# Patient Record
Sex: FEMALE | Race: WHITE | Employment: UNEMPLOYED | URBAN - METROPOLITAN AREA
[De-identification: names, ages, dates, MRNs, and addresses within clinical notes are randomized per-mention and may not be internally consistent; named-entity substitution may affect disease eponyms.]

---

## 2017-06-22 ENCOUNTER — HOSPITAL ENCOUNTER (EMERGENCY)
Age: 37
Discharge: STILL ADMITTED OR ADMINISTRATIVE | End: 2017-06-22
Attending: EMERGENCY MEDICINE

## 2017-06-22 ENCOUNTER — HOSPITAL ENCOUNTER (EMERGENCY)
Age: 37
Discharge: HOME | End: 2017-06-23
Attending: EMERGENCY MEDICINE
Payer: MEDICAID

## 2017-06-22 ENCOUNTER — APPOINTMENT (OUTPATIENT)
Dept: GENERAL RADIOLOGY | Age: 37
End: 2017-06-22
Attending: EMERGENCY MEDICINE
Payer: MEDICAID

## 2017-06-22 VITALS
BODY MASS INDEX: 42.23 KG/M2 | HEIGHT: 68 IN | OXYGEN SATURATION: 100 % | RESPIRATION RATE: 22 BRPM | WEIGHT: 278.66 LBS | TEMPERATURE: 98.2 F | HEART RATE: 76 BPM

## 2017-06-22 DIAGNOSIS — M79.602 ARM PAIN, DIFFUSE, LEFT: ICD-10-CM

## 2017-06-22 DIAGNOSIS — M79.18 PAIN OF RHOMBOID MUSCLE: Primary | ICD-10-CM

## 2017-06-22 DIAGNOSIS — M54.2 NECK PAIN: ICD-10-CM

## 2017-06-22 LAB
ALBUMIN SERPL BCP-MCNC: 3.8 G/DL (ref 3.5–5)
ALBUMIN/GLOB SERPL: 1.1 {RATIO} (ref 1.1–2.2)
ALP SERPL-CCNC: 52 U/L (ref 45–117)
ALT SERPL-CCNC: 34 U/L (ref 12–78)
ANION GAP BLD CALC-SCNC: 7 MMOL/L (ref 5–15)
AST SERPL W P-5'-P-CCNC: 19 U/L (ref 15–37)
BASOPHILS # BLD AUTO: 0 K/UL (ref 0–0.1)
BASOPHILS # BLD: 1 % (ref 0–1)
BILIRUB SERPL-MCNC: 0.9 MG/DL (ref 0.2–1)
BUN SERPL-MCNC: 11 MG/DL (ref 6–20)
BUN/CREAT SERPL: 13 (ref 12–20)
CALCIUM SERPL-MCNC: 9 MG/DL (ref 8.5–10.1)
CHLORIDE SERPL-SCNC: 106 MMOL/L (ref 97–108)
CK SERPL-CCNC: 74 U/L (ref 26–192)
CO2 SERPL-SCNC: 25 MMOL/L (ref 21–32)
CREAT SERPL-MCNC: 0.83 MG/DL (ref 0.55–1.02)
EOSINOPHIL # BLD: 0.1 K/UL (ref 0–0.4)
EOSINOPHIL NFR BLD: 2 % (ref 0–7)
ERYTHROCYTE [DISTWIDTH] IN BLOOD BY AUTOMATED COUNT: 15.5 % (ref 11.5–14.5)
GLOBULIN SER CALC-MCNC: 3.6 G/DL (ref 2–4)
GLUCOSE SERPL-MCNC: 85 MG/DL (ref 65–100)
HCT VFR BLD AUTO: 32.5 % (ref 35–47)
HGB BLD-MCNC: 10.3 G/DL (ref 11.5–16)
LYMPHOCYTES # BLD AUTO: 36 % (ref 12–49)
LYMPHOCYTES # BLD: 2 K/UL (ref 0.8–3.5)
MCH RBC QN AUTO: 23.9 PG (ref 26–34)
MCHC RBC AUTO-ENTMCNC: 31.7 G/DL (ref 30–36.5)
MCV RBC AUTO: 75.4 FL (ref 80–99)
MONOCYTES # BLD: 0.4 K/UL (ref 0–1)
MONOCYTES NFR BLD AUTO: 7 % (ref 5–13)
NEUTS SEG # BLD: 3.1 K/UL (ref 1.8–8)
NEUTS SEG NFR BLD AUTO: 54 % (ref 32–75)
PLATELET # BLD AUTO: 187 K/UL (ref 150–400)
POTASSIUM SERPL-SCNC: 3.6 MMOL/L (ref 3.5–5.1)
PROT SERPL-MCNC: 7.4 G/DL (ref 6.4–8.2)
RBC # BLD AUTO: 4.31 M/UL (ref 3.8–5.2)
SODIUM SERPL-SCNC: 138 MMOL/L (ref 136–145)
TROPONIN I SERPL-MCNC: <0.04 NG/ML
WBC # BLD AUTO: 5.6 K/UL (ref 3.6–11)

## 2017-06-22 PROCEDURE — 82550 ASSAY OF CK (CPK): CPT | Performed by: EMERGENCY MEDICINE

## 2017-06-22 PROCEDURE — 99282 EMERGENCY DEPT VISIT SF MDM: CPT

## 2017-06-22 PROCEDURE — 80053 COMPREHEN METABOLIC PANEL: CPT | Performed by: EMERGENCY MEDICINE

## 2017-06-22 PROCEDURE — 84484 ASSAY OF TROPONIN QUANT: CPT | Performed by: EMERGENCY MEDICINE

## 2017-06-22 PROCEDURE — 75810000275 HC EMERGENCY DEPT VISIT NO LEVEL OF CARE

## 2017-06-22 PROCEDURE — 93005 ELECTROCARDIOGRAM TRACING: CPT

## 2017-06-22 PROCEDURE — 96372 THER/PROPH/DIAG INJ SC/IM: CPT

## 2017-06-22 PROCEDURE — 85025 COMPLETE CBC W/AUTO DIFF WBC: CPT | Performed by: EMERGENCY MEDICINE

## 2017-06-22 PROCEDURE — 72050 X-RAY EXAM NECK SPINE 4/5VWS: CPT

## 2017-06-22 PROCEDURE — 36415 COLL VENOUS BLD VENIPUNCTURE: CPT | Performed by: EMERGENCY MEDICINE

## 2017-06-22 RX ORDER — KETOROLAC TROMETHAMINE 30 MG/ML
30 INJECTION, SOLUTION INTRAMUSCULAR; INTRAVENOUS
Status: COMPLETED | OUTPATIENT
Start: 2017-06-22 | End: 2017-06-23

## 2017-06-22 RX ORDER — KETOROLAC TROMETHAMINE 30 MG/ML
30 INJECTION, SOLUTION INTRAMUSCULAR; INTRAVENOUS
Status: DISCONTINUED | OUTPATIENT
Start: 2017-06-22 | End: 2017-06-22

## 2017-06-23 LAB
ATRIAL RATE: 78 BPM
CALCULATED P AXIS, ECG09: 30 DEGREES
CALCULATED R AXIS, ECG10: 7 DEGREES
CALCULATED T AXIS, ECG11: 19 DEGREES
DIAGNOSIS, 93000: NORMAL
P-R INTERVAL, ECG05: 158 MS
Q-T INTERVAL, ECG07: 386 MS
QRS DURATION, ECG06: 82 MS
QTC CALCULATION (BEZET), ECG08: 440 MS
VENTRICULAR RATE, ECG03: 78 BPM

## 2017-06-23 PROCEDURE — 74011250636 HC RX REV CODE- 250/636: Performed by: EMERGENCY MEDICINE

## 2017-06-23 RX ORDER — TRAMADOL HYDROCHLORIDE 50 MG/1
50 TABLET ORAL
Qty: 12 TAB | Refills: 0 | Status: SHIPPED | OUTPATIENT
Start: 2017-06-23 | End: 2017-07-03

## 2017-06-23 RX ADMIN — KETOROLAC TROMETHAMINE 30 MG: 30 INJECTION, SOLUTION INTRAMUSCULAR at 00:17

## 2017-06-23 NOTE — ED NOTES
MD Tyson Jhaveri has done a bedside review of the discharge instructions. The patient is in understanding. The patients line(s) are removed. The patient is dressed, and belongings together for discharge.

## 2017-06-23 NOTE — DISCHARGE INSTRUCTIONS
Musculoskeletal Pain: Care Instructions  Your Care Instructions  Different problems with the bones, muscles, nerves, ligaments, and tendons in the body can cause pain. One or more areas of your body may ache or burn. Or they may feel tired, stiff, or sore. The medical term for this type of pain is musculoskeletal pain. It can have many different causes. Sometimes the pain is caused by an injury such as a strain or sprain. Or you might have pain from using one part of your body in the same way over and over again. This is called overuse. In some cases, the cause of the pain is another health problem such as arthritis or fibromyalgia. The doctor will examine you and ask you questions about your health to help find the cause of your pain. Blood tests or imaging tests like an X-ray may also be helpful. But sometimes doctors can't find a cause of the pain. Treatment depends on your symptoms and the cause of the pain, if known. The doctor has checked you carefully, but problems can develop later. If you notice any problems or new symptoms, get medical treatment right away. Follow-up care is a key part of your treatment and safety. Be sure to make and go to all appointments, and call your doctor if you are having problems. It's also a good idea to know your test results and keep a list of the medicines you take. How can you care for yourself at home? · Rest until you feel better. · Do not do anything that makes the pain worse. Return to exercise gradually if you feel better and your doctor says it's okay. · Be safe with medicines. Read and follow all instructions on the label. ¨ If the doctor gave you a prescription medicine for pain, take it as prescribed. ¨ If you are not taking a prescription pain medicine, ask your doctor if you can take an over-the-counter medicine. · Put ice or a cold pack on the area for 10 to 20 minutes at a time to ease pain. Put a thin cloth between the ice and your skin.   When should you call for help? Call your doctor now or seek immediate medical care if:  · You have new pain, or your pain gets worse. · You have new symptoms such as a fever, a rash, or chills. Watch closely for changes in your health, and be sure to contact your doctor if:  · You do not get better as expected. Where can you learn more? Go to [employer] Search.be  Enter Q624 in the search box to learn more about \"Musculoskeletal Pain: Care Instructions. \"   © 0519-5910 Healthwise, Incorporated. Care instructions adapted under license by Mirtha Marmolejo (which disclaims liability or warranty for this information). This care instruction is for use with your licensed healthcare professional. If you have questions about a medical condition or this instruction, always ask your healthcare professional. [address] any warranty or liability for your use of this information.   Content Version: 32.7.979763; Current as of: November 20, 2015

## 2017-06-23 NOTE — ED PROVIDER NOTES
HPI Comments: Signa Collet is a 30 y.o. female with history significant for HTN presenting ambulatory to [facility] ED with c/o gradually worsening pain from her left neck region to left upper extremity. Per pt, she has been experiencing an aching sensation from the left lateral neck radiating down towards the left upper extremity. Pt informs the sensation provides with a moderate 7/10 intensity. Pt notes the regions feel like they are \"overused\" and \"wore out\". Pt additionally informs of intermittent nausea and a decreased appetite for the past three days. Pt states her LMP was 06/14/2017. Of note, pt reports she is not from the [facility] and is from [place]. She informs she is town as she was visiting [address] [place] [address] with her kids. Pt denies any history of MVA's, and denies any recent injury or trauma to the aforementioned areas. Pt additionally specifically denies any vomiting, fevers, chills, numbness, chest pain, or SOB. PCP: TIMMY Clark    Social Hx: - EtOH; - Smoker; - Illicit Drugs    There are no other changes, complaints or physical findings at this time. Written by THEODORA Coy, as dictated by Kisha Moss MD.     The history is provided by the patient. History reviewed. No pertinent past medical history. History reviewed. No pertinent surgical history. History reviewed. No pertinent family history. Social History     Social History    Marital status:      Spouse name: N/A    Number of children: N/A    Years of education: N/A     Occupational History    Not on file. Social History Main Topics    Smoking status: Never Smoker    Smokeless tobacco: Not on file    Alcohol use No    Drug use: No    Sexual activity: Not on file     Other Topics Concern    Not on file     Social History Narrative    No narrative on file     ALLERGIES: Iron    Review of Systems   Constitutional: Positive for appetite change (dec).  Negative for activity change, fatigue and fever.   HENT: Negative. Negative for congestion, rhinorrhea and sore throat. Respiratory: Negative. Negative for cough, shortness of breath and wheezing. Cardiovascular: Negative. Negative for chest pain and leg swelling. Gastrointestinal: Positive for nausea. Negative for abdominal distention, abdominal pain, constipation, diarrhea and vomiting. Endocrine: Negative. Genitourinary: Negative for difficulty urinating, dysuria, menstrual problem, vaginal bleeding and vaginal discharge. Musculoskeletal: Positive for arthralgias (LUE), myalgias (LUE) and neck pain. Negative for joint swelling. Skin: Negative. Negative for rash. Neurological: Negative. Negative for dizziness, weakness, light-headedness, numbness and headaches. Psychiatric/Behavioral: Negative. Vitals:    06/22/17 2142   Pulse: 76   Resp: 22   Temp: 98.2 °F (36.8 °C)   SpO2: 100%   Weight: 126.4 kg (278 lb 10.6 oz)   Height: 5' 8\" (1.727 m)          Physical Exam   Constitutional: She is oriented to person, place, and time. She appears well-developed and well-nourished. HENT:   Head: Atraumatic. Eyes: EOM are normal.   Neck:   Full ROM with mild discomfort with left rotary movement and flexion   Cardiovascular: Normal rate, regular rhythm, normal heart sounds and intact distal pulses. Exam reveals no gallop and no friction rub. No murmur heard. Pulmonary/Chest: Effort normal and breath sounds normal. No respiratory distress. She has no wheezes. She has no rales. She exhibits no tenderness. Abdominal: Soft. Bowel sounds are normal. She exhibits no distension and no mass. There is no tenderness. There is no rebound and no guarding. Musculoskeletal: Normal range of motion. She exhibits tenderness. She exhibits no edema. ttp over left, paraspinal muscles  No c-spine ttp  Tenderness over L rhomboid and trapezius which reproduces the pain   Neurological: She is oriented to person, place, and time.  She has normal strength. No sensory deficit. EOM intact, pupils direct and consensual, reflexes intact, CN II-XII grossly intact, strength equal and symmetric in LUE, sensation intact in LUE, alert and oriented   Skin: Skin is warm. Psychiatric: She has a normal mood and affect. Nursing note and vitals reviewed. MDM  Number of Diagnoses or Management Options  Diagnosis management comments: DDx: MSK strain, muscular spasms, DDD       Amount and/or Complexity of Data Reviewed  Clinical lab tests: ordered and reviewed  Tests in the radiology section of CPT®: ordered and reviewed  Tests in the medicine section of CPT®: reviewed and ordered    Patient Progress  Patient progress: stable    ED Course     Procedures    EKG interpretation: (Preliminary) 2146  Rhythm: normal sinus rhythm; and regular . Rate (approx.): 78; Axis: normal; AL interval: normal; QRS interval: normal ; ST/T wave: normal; other: minimal voltage criteria for LVH;   This note is prepared by Obi Butler acting as Scribe for Kar Hess MD.    LABORATORY TESTS:  Recent Results (from the past 12 hour(s))   EKG, 12 LEAD, INITIAL    Collection Time: 06/22/17  9:46 PM   Result Value Ref Range    Ventricular Rate 78 BPM    Atrial Rate 78 BPM    P-R Interval 158 ms    QRS Duration 82 ms    Q-T Interval 386 ms    QTC Calculation (Bezet) 440 ms    Calculated P Axis 30 degrees    Calculated R Axis 7 degrees    Calculated T Axis 19 degrees    Diagnosis       Normal sinus rhythm  Minimal voltage criteria for LVH, may be normal variant  Possible Anterior infarct , age undetermined  Abnormal ECG  No previous ECGs available     CBC WITH AUTOMATED DIFF    Collection Time: 06/22/17  9:53 PM   Result Value Ref Range    WBC 5.6 3.6 - 11.0 K/uL    RBC 4.31 3.80 - 5.20 M/uL    HGB 10.3 (L) 11.5 - 16.0 g/dL    HCT 32.5 (L) 35.0 - 47.0 %    MCV 75.4 (L) 80.0 - 99.0 FL    MCH 23.9 (L) 26.0 - 34.0 PG    MCHC 31.7 30.0 - 36.5 g/dL    RDW 15.5 (H) 11.5 - 14.5 %    PLATELET 496 656 - 400 K/uL    NEUTROPHILS 54 32 - 75 %    LYMPHOCYTES 36 12 - 49 %    MONOCYTES 7 5 - 13 %    EOSINOPHILS 2 0 - 7 %    BASOPHILS 1 0 - 1 %    ABS. NEUTROPHILS 3.1 1.8 - 8.0 K/UL    ABS. LYMPHOCYTES 2.0 0.8 - 3.5 K/UL    ABS. MONOCYTES 0.4 0.0 - 1.0 K/UL    ABS. EOSINOPHILS 0.1 0.0 - 0.4 K/UL    ABS. BASOPHILS 0.0 0.0 - 0.1 K/UL   METABOLIC PANEL, COMPREHENSIVE    Collection Time: 06/22/17  9:53 PM   Result Value Ref Range    Sodium 138 136 - 145 mmol/L    Potassium 3.6 3.5 - 5.1 mmol/L    Chloride 106 97 - 108 mmol/L    CO2 25 21 - 32 mmol/L    Anion gap 7 5 - 15 mmol/L    Glucose 85 65 - 100 mg/dL    BUN 11 6 - 20 MG/DL    Creatinine 0.83 0.55 - 1.02 MG/DL    BUN/Creatinine ratio 13 12 - 20      GFR est AA >60 >60 ml/min/1.73m2    GFR est non-AA >60 >60 ml/min/1.73m2    Calcium 9.0 8.5 - 10.1 MG/DL    Bilirubin, total 0.9 0.2 - 1.0 MG/DL    ALT (SGPT) 34 12 - 78 U/L    AST (SGOT) 19 15 - 37 U/L    Alk. phosphatase 52 45 - 117 U/L    Protein, total 7.4 6.4 - 8.2 g/dL    Albumin 3.8 3.5 - 5.0 g/dL    Globulin 3.6 2.0 - 4.0 g/dL    A-G Ratio 1.1 1.1 - 2.2     CK W/ REFLX CKMB    Collection Time: 06/22/17  9:53 PM   Result Value Ref Range    CK 74 26 - 192 U/L   TROPONIN I    Collection Time: 06/22/17  9:53 PM   Result Value Ref Range    Troponin-I, Qt. <0.04 <0.05 ng/mL     IMAGING RESULTS:  History: Neck pain.     AP, lateral, odontoid, swimmer's and oblique views of the cervical spine  demonstrate no fracture or subluxation. Disc spaces are preserved. No bony  foraminal encroachment is seen. There is normal alignment of the vertebral  bodies. The soft tissues are unremarkable.     IMPRESSION  IMPRESSION:  NORMAL STUDY. MEDICATIONS GIVEN:  Medications   ketorolac (TORADOL) injection 30 mg (30 mg IntraVENous Given 6/23/17 0017)     IMPRESSION:  1. Pain of rhomboid muscle    2. Arm pain, diffuse, left    3. Neck pain      PLAN:  1.    Follow-up Information     Follow up With Details Comments Contact Info [place], [place] In 3 days  [address]   [phone]      [facility] EMERGENCY DEPT  As needed, If symptoms worsen [address] [place] [address]  [phone]        Return to ED if worse     DISCHARGE NOTE:     1:20 AM  The patient is ready for discharge. The patient signs, symptoms, diagnosis, and discharge instructions have been discussed and the patient has conveyed their understanding. The patient is to follow-up as reccommended or returned to the ER should their symptoms worsen. Plan has been discussed and patient is in agreement. This note is prepared by Abhi Merchant acting as Scribe for Reba Aguilar MD.    Reba Aguilar MD: This scribe's documentation has been prepared under my direction and personally reviewed by me in its entirety. I confirm that the note above accurately reflects all work, treatment procedures and medical decision makings performed by me.

## 2017-06-23 NOTE — ED TRIAGE NOTES
Assumed care of patient in the Emergency Department, addressed patient and family with AIDET process. The patient reports to the ED with complaints of neck and shoulder pain, left side, for weeks. Headache for weeks, no vision disturbances. Denies n/v/d. Denies recent cough. Family at bedside. Denies cardiac history. Patient is resting comfortably, bed in the lowest position, side rails raised, call bell in hand, lights dim. Instructed patient to not get up without assistance, and to ring the call bell for any questions or concerns. Updated patient on the plan of care.

## 2018-02-13 ENCOUNTER — HOSPITAL ENCOUNTER (EMERGENCY)
Age: 38
Discharge: HOME | End: 2018-02-13
Attending: EMERGENCY MEDICINE
Payer: SELF-PAY

## 2018-02-13 ENCOUNTER — APPOINTMENT (OUTPATIENT)
Dept: GENERAL RADIOLOGY | Age: 38
End: 2018-02-13
Attending: EMERGENCY MEDICINE
Payer: SELF-PAY

## 2018-02-13 VITALS
RESPIRATION RATE: 15 BRPM | DIASTOLIC BLOOD PRESSURE: 97 MMHG | OXYGEN SATURATION: 99 % | BODY MASS INDEX: 36.37 KG/M2 | HEART RATE: 67 BPM | WEIGHT: 240 LBS | TEMPERATURE: 98.2 F | HEIGHT: 68 IN | SYSTOLIC BLOOD PRESSURE: 138 MMHG

## 2018-02-13 DIAGNOSIS — Z23 NEED FOR TETANUS BOOSTER: Primary | ICD-10-CM

## 2018-02-13 DIAGNOSIS — S63.601A SPRAIN OF RIGHT THUMB, UNSPECIFIED SITE OF FINGER, INITIAL ENCOUNTER: ICD-10-CM

## 2018-02-13 DIAGNOSIS — S60.311A ABRASION OF RIGHT THUMB, INITIAL ENCOUNTER: ICD-10-CM

## 2018-02-13 PROCEDURE — 77030008323 HC SPLNT FNGR GTR DJOR -A

## 2018-02-13 PROCEDURE — 99283 EMERGENCY DEPT VISIT LOW MDM: CPT

## 2018-02-13 PROCEDURE — 90471 IMMUNIZATION ADMIN: CPT

## 2018-02-13 PROCEDURE — 73140 X-RAY EXAM OF FINGER(S): CPT

## 2018-02-13 PROCEDURE — 90715 TDAP VACCINE 7 YRS/> IM: CPT | Performed by: EMERGENCY MEDICINE

## 2018-02-13 PROCEDURE — 74011250636 HC RX REV CODE- 250/636: Performed by: EMERGENCY MEDICINE

## 2018-02-13 RX ADMIN — TETANUS TOXOID, REDUCED DIPHTHERIA TOXOID AND ACELLULAR PERTUSSIS VACCINE, ADSORBED 0.5 ML: 5; 2.5; 8; 8; 2.5 SUSPENSION INTRAMUSCULAR at 11:51

## 2018-02-13 NOTE — LETTER
[facility] EMERGENCY DEPT 
[address] [place] [phone] 
[phone] Work Note Date: 2/13/2018 To Whom It May concern: 
 
Katina Keane was seen and treated today in the emergency room by the following provider(s): 
Attending Provider: Ronak Sanders DO.   
 
Katina Keane may return to work on 2/16/18. Sincerely, Ronak Sanders DO

## 2018-02-13 NOTE — ED PROVIDER NOTES
EMERGENCY DEPARTMENT HISTORY AND PHYSICAL EXAM    12:08 PM      Date: 2018  Patient Name: Ellen Christina    History of Presenting Illness     Chief Complaint   Patient presents with    Finger Pain         History Provided By: Patient    Chief Complaint: Finger Pain   Duration:  Hours  Timing:  Acute  Location: Right thumb   Quality: Throbbing   Severity: 6 out of 10  Modifying Factors: None reported   Associated Symptoms: right thumb laceration and swelling      Additional History (Context): Ellen Christina is a 41 y.o. female with PMHx of hypertension and asthma who presents with c/o acute 6/10 right thumb pain which started yesterday. Describes pain as throbbing. Pt notes her thumb got crushed at work last night. Associated Sx includes right thumb laceration and swelling. Pt is unsure of when her last tetanus shot was. No mediation taken to treat Sx. Notes compliance with HTN medication. No other complaint and Sx. PCP: TIMMY Goldsmith    Current Outpatient Prescriptions   Medication Sig Dispense Refill    verapamil (CALAN) 120 mg tablet Take 120 mg by mouth three (3) times daily. Past History     Past Medical History:  Past Medical History:   Diagnosis Date    Anemia     receives iron infusions    Asthma     Hypertension        Past Surgical History:  Past Surgical History:   Procedure Laterality Date    HX  SECTION      HX CHOLECYSTECTOMY         Family History:  Family History   Problem Relation Age of Onset    Cancer Maternal Grandmother     Stroke Mother     Obesity Father        Social History:  Social History   Substance Use Topics    Smoking status: Never Smoker    Smokeless tobacco: Never Used    Alcohol use No       Allergies:   Allergies   Allergen Reactions    Iron Other (comments)     Burning per pt reports    Iron Other (comments)     Body feels like on fire    Zofran Odt [Ondansetron] Hives    Zoloft [Sertraline] Other (comments)     Burning per pt reports         Review of Systems     Review of Systems   Constitutional: Negative for fever. Musculoskeletal:        Positive for right thumb pain   Positive for right thumb swelling    Skin: Wound: right thumb    All other systems reviewed and are negative. Physical Exam     Visit Vitals    BP (!) 138/97 (BP 1 Location: Right arm, BP Patient Position: At rest)    Pulse 67    Temp 98.2 °F (36.8 °C)    Resp 15    Ht 5' 8\" (1.727 m)    Wt 108.9 kg (240 lb)    LMP 02/13/2018    SpO2 99%    BMI 36.49 kg/m2     Physical Exam   Constitutional: She is oriented to person, place, and time. She appears well-developed and well-nourished. HENT:   Head: Normocephalic and atraumatic. Neck: Neck supple. No JVD present. Musculoskeletal: She exhibits no edema. R hand: radial pulse 2+  Cap refill 1 sec  Gross sensation intact  No MCP tenderness digits 2-5  Able to fully extend all digits  No snuffbox tenderness  No pain with axial loading or distraction of thumb  Abrasion over 1st digit PIP  Full ROM in wrist   Neurological: She is alert and oriented to person, place, and time. Skin: Skin is warm and dry. No erythema. Radiologic Studies -   XR THUMB RT MIN 2 V    (Results Pending)   no acute process     Impression:  No acute process. Medical Decision Making   I am the first provider for this patient. I reviewed the vital signs, available nursing notes, past medical history, past surgical history, family history and social history. Vital Signs-Reviewed the patient's vital signs. Pulse Oximetry Analysis -  99% on room air, normal     Records Reviewed: Nursing Notes (Time of Review: 12:08 PM)    ED Course: Progress Notes, Reevaluation, and Consults:    Provider Notes (Medical Decision Making): 40 y/o female presents with R thumb pain. xr to eval for fx. Update tetanus. No lac.  Doubt scaphoid injury based on exam.     Procedures: Splint, Finger  Date/Time: 2/13/2018 12:29 PM  Performed by: Diane Bowman  Authorized by: Diane Bowman     Consent:     Consent obtained:  Verbal    Consent given by:  Patient    Risks discussed:  Numbness and pain    Alternatives discussed:  No treatment  Pre-procedure details:     Sensation:  Normal    Skin color:  Pink  Procedure details:     Laterality:  Right    Location: thumb     Strapping: no      Splint type:  Finger  Post-procedure details:     Pain:  Unchanged    Sensation:  Normal    Skin color:  Pink    Patient tolerance of procedure: Tolerated well, no immediate complications        Discussed xr results with pt, placed in finger splint. Discussed return precautions, stable for dc home. Diagnosis     Clinical Impression:   1. Need for tetanus booster    2. Abrasion of right thumb, initial encounter    3. Sprain of right thumb, unspecified site of finger, initial encounter        Disposition: Discharge    Follow-up Information     Follow up With Details Comments [address], [place] In 1 week  [facility] [address] [place] [address] EMERGENCY DEPT  As needed, If symptoms worsen [facility].  [phone]           Patient's Medications   Start Taking    No medications on file   Continue Taking    VERAPAMIL (CALAN) 120 MG TABLET    Take 120 mg by mouth three (3) times daily. These Medications have changed    No medications on file   Stop Taking    MOMETASONE-FORMOTEROL (DULERA) 100-5 MCG/ACTUATION HFAA HFA INHALER    Take 2 Puffs by inhalation two (2) times a day.     ONDANSETRON HCL (ZOFRAN, AS HYDROCHLORIDE,) 8 MG TABLET    Take 1 Tab by mouth every eight (8) hours as needed for Nausea.     _______________________________    Attestations:  Scribe Attestation     Bailee Putnam acting as a scribe for and in the presence of Mahnaz Mooney, DO      February 13, 2018 at 12:08 PM       Provider Attestation:      I personally performed the services described in the documentation, reviewed the documentation, as recorded by the scribe in my presence, and it accurately and completely records my words and actions.  February 13, 2018 at 12:08 PM - Hu Martinez DO    _______________________________

## 2018-02-13 NOTE — LETTER
[facility] EMERGENCY DEPT 
[facility] [place] [phone] 
[phone] Work Note Date: 2/13/2018 To Whom It May concern: 
 
Robb Mejias was seen and treated today in the emergency room by the following provider(s): 
Attending Provider: Lul Cortés DO.   
 
Robb Mejias may return to work on 2/16/18. Sincerely, Lul Cortés DO

## 2018-02-13 NOTE — DISCHARGE INSTRUCTIONS
Scrapes (Abrasions): Care Instructions  Your Care Instructions  Scrapes (abrasions) are wounds where your skin has been rubbed or torn off. Most scrapes do not go deep into the skin, but some may remove several layers of skin. Scrapes usually don't bleed much, but they may ooze pinkish fluid. Scrapes on the head or face may appear worse than they are. They may bleed a lot because of the good blood supply to this area. Most scrapes heal well and may not need a bandage. They usually heal within 3 to 7 days. A large, deep scrape may take 1 to 2 weeks or longer to heal. A scab may form on some scrapes. Follow-up care is a key part of your treatment and safety. Be sure to make and go to all appointments, and call your doctor if you are having problems. It's also a good idea to know your test results and keep a list of the medicines you take. How can you care for yourself at home? · If your doctor told you how to care for your wound, follow your doctor's instructions. If you did not get instructions, follow this general advice:  ¨ Wash the scrape with clean water 2 times a day. Don't use hydrogen peroxide or alcohol, which can slow healing. ¨ You may cover the scrape with a thin layer of petroleum jelly, such as Vaseline, and a nonstick bandage. ¨ Apply more petroleum jelly and replace the bandage as needed. · Prop up the injured area on a pillow anytime you sit or lie down during the next 3 days. Try to keep it above the level of your heart. This will help reduce swelling. · Be safe with medicines. Take pain medicines exactly as directed. ¨ If the doctor gave you a prescription medicine for pain, take it as prescribed. ¨ If you are not taking a prescription pain medicine, ask your doctor if you can take an over-the-counter medicine. When should you call for help?   Call your doctor now or seek immediate medical care if:  ? · You have signs of infection, such as:  ¨ Increased pain, swelling, warmth, or redness around the scrape. ¨ Red streaks leading from the scrape. ¨ Pus draining from the scrape. ¨ A fever. ? · The scrape starts to bleed, and blood soaks through the bandage. Oozing small amounts of blood is normal.   ? Watch closely for changes in your health, and be sure to contact your doctor if the scrape is not getting better each day. Where can you learn more? Go to http://bennie-chelsey.info/. Enter A374 in the search box to learn more about \"Scrapes (Abrasions): Care Instructions. \"  Current as of: March 20, 2017  Content Version: 11.4  © 5228-1752 [employer]. Care instructions adapted under license by [employer] (which disclaims liability or warranty for this information). If you have questions about a medical condition or this instruction, always ask your healthcare professional. [place] [address] any warranty or liability for your use of this information.

## 2018-10-24 ENCOUNTER — HOSPITAL ENCOUNTER (EMERGENCY)
Age: 38
Discharge: HOME | End: 2018-10-24
Attending: EMERGENCY MEDICINE | Admitting: EMERGENCY MEDICINE
Payer: SELF-PAY

## 2018-10-24 ENCOUNTER — APPOINTMENT (OUTPATIENT)
Dept: GENERAL RADIOLOGY | Age: 38
End: 2018-10-24
Attending: EMERGENCY MEDICINE
Payer: SELF-PAY

## 2018-10-24 VITALS
HEIGHT: 69 IN | BODY MASS INDEX: 38.51 KG/M2 | TEMPERATURE: 98.5 F | SYSTOLIC BLOOD PRESSURE: 137 MMHG | RESPIRATION RATE: 16 BRPM | OXYGEN SATURATION: 100 % | DIASTOLIC BLOOD PRESSURE: 99 MMHG | WEIGHT: 260 LBS | HEART RATE: 79 BPM

## 2018-10-24 DIAGNOSIS — S99.911A RIGHT ANKLE INJURY, INITIAL ENCOUNTER: Primary | ICD-10-CM

## 2018-10-24 PROCEDURE — 74011250637 HC RX REV CODE- 250/637: Performed by: EMERGENCY MEDICINE

## 2018-10-24 PROCEDURE — 99284 EMERGENCY DEPT VISIT MOD MDM: CPT

## 2018-10-24 PROCEDURE — 73610 X-RAY EXAM OF ANKLE: CPT

## 2018-10-24 RX ORDER — NAPROXEN 500 MG/1
500 TABLET ORAL 2 TIMES DAILY WITH MEALS
Qty: 20 TAB | Refills: 0 | Status: SHIPPED | OUTPATIENT
Start: 2018-10-24 | End: 2018-11-03

## 2018-10-24 RX ORDER — ACETAMINOPHEN 500 MG
1000 TABLET ORAL
Status: COMPLETED | OUTPATIENT
Start: 2018-10-24 | End: 2018-10-24

## 2018-10-24 RX ORDER — HYDROCODONE BITARTRATE AND ACETAMINOPHEN 7.5; 325 MG/1; MG/1
1 TABLET ORAL
Status: DISCONTINUED | OUTPATIENT
Start: 2018-10-24 | End: 2018-10-24

## 2018-10-24 RX ORDER — IBUPROFEN 600 MG/1
600 TABLET ORAL
Status: COMPLETED | OUTPATIENT
Start: 2018-10-24 | End: 2018-10-24

## 2018-10-24 RX ORDER — ACETAMINOPHEN 500 MG
1000 TABLET ORAL
Qty: 50 TAB | Refills: 0 | Status: SHIPPED | OUTPATIENT
Start: 2018-10-24 | End: 2019-10-11

## 2018-10-24 RX ADMIN — ACETAMINOPHEN 1000 MG: 500 TABLET, FILM COATED ORAL at 22:22

## 2018-10-24 RX ADMIN — IBUPROFEN 600 MG: 600 TABLET ORAL at 21:57

## 2018-10-24 NOTE — LETTER
NOTIFICATION RETURN TO WORK / SCHOOL 
 
10/24/2018 10:45 PM 
 
Ms. Derek Damon [address] [facility] [phone] To Whom It May Concern: 
 
Derek Damon is currently under the care of [facility] EMERGENCY DEPT. She will return to work/school on: 10/27/18 If there are questions or concerns please have the patient contact our office. Sincerely, Larry Pollard MD

## 2018-10-25 NOTE — ED PROVIDER NOTES
Aidee Aponte is a 41 y.o. Female who missed step, injuring her right ankle about an hour ago. No other injury. C/o severe pain to right ankle with swelling to lateral aspect. Nothing taken. Worse with movement, attempted ambulation. No recent illness. Past Medical History:   Diagnosis Date    Anemia     receives iron infusions    Asthma     Hypertension        Past Surgical History:   Procedure Laterality Date    HX  SECTION      HX CHOLECYSTECTOMY           Family History:   Problem Relation Age of Onset    Cancer Maternal Grandmother     Stroke Mother     Obesity Father        Social History     Socioeconomic History    Marital status:      Spouse name: Not on file    Number of children: Not on file    Years of education: Not on file    Highest education level: Not on file   Social Needs    Financial resource strain: Not on file    Food insecurity - worry: Not on file    Food insecurity - inability: Not on file   [employer] needs - medical: Not on file   [employer] needs - non-medical: Not on file   Occupational History    Not on file   Tobacco Use    Smoking status: Never Smoker    Smokeless tobacco: Never Used   Substance and Sexual Activity    Alcohol use: No    Drug use: No    Sexual activity: Not on file   Other Topics Concern    Not on file   Social History Narrative    ** Merged History Encounter **              ALLERGIES: Iron; Iron; Zofran odt [ondansetron]; and Zoloft [sertraline]    Review of Systems   Constitutional: Negative for fever. Respiratory: Negative for shortness of breath. Cardiovascular: Negative for chest pain. Gastrointestinal: Negative for abdominal pain. Genitourinary: Negative for difficulty urinating. Musculoskeletal: Positive for arthralgias, gait problem and joint swelling. Skin: Negative for wound. Neurological: Negative for numbness. Psychiatric/Behavioral: Positive for sleep disturbance.        Vitals: 10/24/18 2136   BP: (!) 137/99   Pulse: 79   Resp: 16   Temp: 98.5 °F (36.9 °C)   SpO2: 100%   Weight: 117.9 kg (260 lb)   Height: 5' 9\" (1.753 m)            Physical Exam   Constitutional: She is oriented to person, place, and time. She appears well-developed and well-nourished. Non-toxic appearance. She does not have a sickly appearance. She does not appear ill. She appears distressed. HENT:   Head: Normocephalic and atraumatic. Eyes: EOM are normal.   Neck: Neck supple. Cardiovascular: Normal rate, regular rhythm and normal heart sounds. Pulmonary/Chest: Breath sounds normal.   Abdominal: Soft. Musculoskeletal:        Right ankle: She exhibits decreased range of motion and swelling. She exhibits no ecchymosis, no deformity, no laceration and normal pulse. Tenderness. Lateral malleolus, medial malleolus, AITFL, CF ligament and posterior TFL tenderness found. No head of 5th metatarsal and no proximal fibula tenderness found. Achilles tendon normal.   Neurological: She is alert and oriented to person, place, and time. No cranial nerve deficit. Skin: Skin is warm and dry. Capillary refill takes less than 2 seconds. She is not diaphoretic. Psychiatric: She has a normal mood and affect. Nursing note and vitals reviewed. MDM       Procedures  Vitals:  Patient Vitals for the past 12 hrs:   Temp Pulse Resp BP SpO2   10/24/18 2136 98.5 °F (36.9 °C) 79 16 (!) 137/99 100 %         Medications ordered:   Medications   ibuprofen (MOTRIN) tablet 600 mg (600 mg Oral Given 10/24/18 2157)   acetaminophen (TYLENOL) tablet 1,000 mg (1,000 mg Oral Given 10/24/18 2222)         Lab findings:  No results found for this or any previous visit (from the past 12 hour(s)).     EKG interpretation by ED Physician:      X-Ray, CT or other radiology findings or impressions:  XR ANKLE RT MIN 3 V    (Results Pending)   nothing acute per my interp    Progress notes, Consult notes or additional Procedure notes:   Placed in air splint, crutches by tech with md sup, nv intact  I have discussed with patient and/or family/sig other the results, interpretation of any imaging if performed, suspected diagnosis and treatment plan to include instructions regarding the diagnoses listed to which understanding was expressed with all questions answered      Reevaluation of patient:   stable    Disposition:  Diagnosis:   1. Right ankle injury, initial encounter        Disposition: home    Follow-up Information     Follow up With Specialties Details Why Contact Info    Candi Grijalva DPM Podiatry Schedule an appointment as soon as possible for a visit    [address] EMERGENCY DEPT Emergency Medicine   [address] [facility]  [phone]               Medication List      START taking these medications    acetaminophen 500 mg tablet  Commonly known as:  TYLENOL EXTRA STRENGTH  Take 2 Tabs by mouth every six (6) hours as needed for Pain. naproxen 500 mg tablet  Commonly known as:  NAPROSYN  Take 1 Tab by mouth two (2) times daily (with meals) for 10 days.         ASK your doctor about these medications    verapamil 120 mg tablet  Commonly known as:  CALAN           Where to Get Your Medications      Information about where to get these medications is not yet available    Ask your nurse or doctor about these medications  · acetaminophen 500 mg tablet  · naproxen 500 mg tablet

## 2019-05-28 ENCOUNTER — HOSPITAL ENCOUNTER (EMERGENCY)
Age: 39
Discharge: HOME | End: 2019-05-28
Attending: EMERGENCY MEDICINE | Admitting: EMERGENCY MEDICINE
Payer: SELF-PAY

## 2019-05-28 ENCOUNTER — APPOINTMENT (OUTPATIENT)
Dept: GENERAL RADIOLOGY | Age: 39
End: 2019-05-28
Attending: EMERGENCY MEDICINE
Payer: SELF-PAY

## 2019-05-28 VITALS
BODY MASS INDEX: 42.13 KG/M2 | TEMPERATURE: 98.9 F | SYSTOLIC BLOOD PRESSURE: 133 MMHG | WEIGHT: 278 LBS | HEIGHT: 68 IN | OXYGEN SATURATION: 100 % | HEART RATE: 67 BPM | RESPIRATION RATE: 16 BRPM | DIASTOLIC BLOOD PRESSURE: 84 MMHG

## 2019-05-28 DIAGNOSIS — M77.8 TENDONITIS OF WRIST, RIGHT: Primary | ICD-10-CM

## 2019-05-28 PROCEDURE — 73110 X-RAY EXAM OF WRIST: CPT

## 2019-05-28 PROCEDURE — 99282 EMERGENCY DEPT VISIT SF MDM: CPT

## 2019-05-28 PROCEDURE — 74011250637 HC RX REV CODE- 250/637: Performed by: EMERGENCY MEDICINE

## 2019-05-28 RX ORDER — IBUPROFEN 400 MG/1
800 TABLET ORAL ONCE
Status: COMPLETED | OUTPATIENT
Start: 2019-05-28 | End: 2019-05-28

## 2019-05-28 RX ADMIN — IBUPROFEN 800 MG: 400 TABLET ORAL at 08:48

## 2019-05-28 NOTE — LETTER
NOTIFICATION RETURN TO WORK / SCHOOL 
 
5/28/2019 8:33 AM 
 
Ms. Agatha Terrell [address] [facility] [address] [id number] To Whom It May Concern: 
 
Agatha Terrell is currently under the care of [facility] EMERGENCY DEPT. She will return to work/school on: 5/31/19 If there are questions or concerns please have the patient contact our office. Sincerely, Miriam Burt MD

## 2019-05-28 NOTE — ED PROVIDER NOTES
EMERGENCY DEPARTMENT HISTORY AND PHYSICAL EXAM    8:32 AM      Date: 2019  Patient Name: Harpreet Rosales    History of Presenting Illness     Chief Complaint   Patient presents with    Wrist Pain         History Provided By: patient    Additional History (Context): Harpreet Rosales is a 42 y.o. female presents with right wrist pain slowly progressing as she played basketball yesterday, no specific injury, much worse with movement, located over the dorsum of the wrist and proximal hand as well as the anterior aspect of the wrist.  No medications tried or other treatments. Tracey Eric PCP: TIMMY Davies    Chief Complaint:   Duration:    Timing:    Location:   Quality:   Severity:   Modifying Factors:   Associated Symptoms:       Current Outpatient Medications   Medication Sig Dispense Refill    verapamil (CALAN) 120 mg tablet Take 120 mg by mouth three (3) times daily.  acetaminophen (TYLENOL EXTRA STRENGTH) 500 mg tablet Take 2 Tabs by mouth every six (6) hours as needed for Pain. 48 Tab 0       Past History     Past Medical History:  Past Medical History:   Diagnosis Date    Anemia     receives iron infusions    Asthma     Hypertension        Past Surgical History:  Past Surgical History:   Procedure Laterality Date    HX  SECTION      HX CHOLECYSTECTOMY         Family History:  Family History   Problem Relation Age of Onset    Cancer Maternal Grandmother     Stroke Mother     Obesity Father        Social History:  Social History     Tobacco Use    Smoking status: Never Smoker    Smokeless tobacco: Never Used   Substance Use Topics    Alcohol use: No    Drug use: No       Allergies:   Allergies   Allergen Reactions    Iron Other (comments)     Burning per pt reports    Iron Other (comments)     Body feels like on fire    Zofran Odt [Ondansetron] Hives    Zoloft [Sertraline] Other (comments)     Burning per pt reports         Review of Systems     Review of Systems   Constitutional: Negative for diaphoresis and fever. HENT: Negative for congestion and sore throat. Eyes: Negative for pain and itching. Respiratory: Negative for cough and shortness of breath. Cardiovascular: Negative for chest pain and palpitations. Gastrointestinal: Negative for abdominal pain and diarrhea. Endocrine: Negative for polydipsia and polyuria. Genitourinary: Negative for dysuria and hematuria. Musculoskeletal: Positive for arthralgias. Negative for myalgias. Skin: Negative for rash and wound. Neurological: Negative for seizures and syncope. Hematological: Does not bruise/bleed easily. Psychiatric/Behavioral: Negative for agitation and hallucinations. Physical Exam       Patient Vitals for the past 12 hrs:   Temp Pulse Resp BP SpO2   05/28/19 0805 98.9 °F (37.2 °C) 67 16 133/84 100 %       Physical Exam   Constitutional: She appears well-developed and well-nourished. HENT:   Head: Normocephalic and atraumatic. Eyes: Conjunctivae are normal. No scleral icterus. Neck: Normal range of motion. Neck supple. No JVD present. Cardiovascular: Normal rate, regular rhythm and intact distal pulses. Pulmonary/Chest: Effort normal. No respiratory distress. Musculoskeletal: Normal range of motion. Right hand neurovascularly intact, tenderness with palpation over the dorsal proximal hand and wrist on the right side less so with palpation of the anterior aspect of the wrist.   Neurological: She is alert. Skin: Skin is warm and dry. Psychiatric: Judgment and thought content normal.   Nursing note and vitals reviewed. Diagnostic Study Results   Labs -  No results found for this or any previous visit (from the past 12 hour(s)). Radiologic Studies -   XR WRIST RT AP/LAT/OBL MIN 3V   Final Result         1. Dorsal right wrist soft tissue swelling without evidence of fracture or acute   malalignment.         Xr Wrist Rt Ap/lat/obl Min 3v    Result Date: 5/28/2019  EXAM: XR WRIST RT AP/LAT/OBL MIN 3V CLINICAL INDICATION/HISTORY: Right wrist pain -Additional: None COMPARISON: Right hand radiographs 2/13/2018 TECHNIQUE: 3 views of the right wrist _______________ FINDINGS: BONES: Osseous alignment is as expected on the provided views. There is a type II lunate. No evidence of fracture. Joint spaces appear preserved. SOFT TISSUES: Soft tissue swelling is noted of the dorsum of the right wrist. No retained radiopaque foreign object. _______________     1. Dorsal right wrist soft tissue swelling without evidence of fracture or acute malalignment. Medications ordered:   Medications   ibuprofen (MOTRIN) tablet 800 mg (800 mg Oral Given 5/28/19 0848)         Medical Decision Making   Initial Medical Decision Making and DDx:     X-ray to rule out fracture, unlikely  Most likely is tendinitis, discussed NSAID use and cold therapy. Note for work. ED Course: Progress Notes, Reevaluation, and Consults:     9:18 AM Pt reevaluated at this time. Discussed results and findings, as well as, diagnosis and plan for discharge. Follow up with doctors/services listed. Return to the emergency department for alarming symptoms. Pt verbalizes understanding and agreement with plan. All questions addressed. Discussed results of x-ray, no bony injury, reiterated ibuprofen 800 mg 3 times a day for 3 days, rest, do not recommend immobilization for an extended period of time, use cold therapy. Work note. I am the first provider for this patient. I reviewed the vital signs, available nursing notes, past medical history, past surgical history, family history and social history. Patient Vitals for the past 12 hrs:   Temp Pulse Resp BP SpO2   05/28/19 0805 98.9 °F (37.2 °C) 67 16 133/84 100 %       Vital Signs-Reviewed the patient's vital signs. Pulse Oximetry Analysis, Cardiac Monitor, 12 lead ekg:      Interpreted by the EP.       Records Reviewed: Nursing notes reviewed (Time of Review: 8:32 AM)    Procedures:   Critical Care Time:   Aspirin: (was aspirin given for stroke?)    Diagnosis     Clinical Impression:   1. Tendonitis of wrist, right        Disposition: Discharged      Follow-up Information     Follow up With Specialties Details Why Contact Zuleika Dumontma Physician Assistant In 2 days  Jax Opus [phone]             Patient's Medications   Start Taking    No medications on file   Continue Taking    ACETAMINOPHEN (TYLENOL EXTRA STRENGTH) 500 MG TABLET    Take 2 Tabs by mouth every six (6) hours as needed for Pain. VERAPAMIL (CALAN) 120 MG TABLET    Take 120 mg by mouth three (3) times daily.    These Medications have changed    No medications on file   Stop Taking    No medications on file     _______________________________    Notes:    Alveta Blizzard, MD using Dragon dictation      _______________________________

## 2019-09-12 ENCOUNTER — HOSPITAL ENCOUNTER (OUTPATIENT)
Dept: GENERAL RADIOLOGY | Age: 39
Discharge: HOME | End: 2019-09-12
Payer: MEDICAID

## 2019-09-12 DIAGNOSIS — G44.209 TENSION HEADACHE: ICD-10-CM

## 2019-09-12 PROCEDURE — 72040 X-RAY EXAM NECK SPINE 2-3 VW: CPT

## 2019-09-12 PROCEDURE — 72050 X-RAY EXAM NECK SPINE 4/5VWS: CPT

## 2019-09-26 ENCOUNTER — HOSPITAL ENCOUNTER (OUTPATIENT)
Dept: MAMMOGRAPHY | Age: 39
Discharge: HOME | End: 2019-09-26
Attending: FAMILY MEDICINE
Payer: MEDICAID

## 2019-09-26 ENCOUNTER — HOSPITAL ENCOUNTER (OUTPATIENT)
Dept: ULTRASOUND IMAGING | Age: 39
Discharge: HOME | End: 2019-09-26
Attending: FAMILY MEDICINE
Payer: MEDICAID

## 2019-09-26 DIAGNOSIS — N63.0 BREAST MASS: ICD-10-CM

## 2019-09-26 PROCEDURE — 77062 BREAST TOMOSYNTHESIS BI: CPT

## 2019-09-26 PROCEDURE — 76642 ULTRASOUND BREAST LIMITED: CPT

## 2019-10-11 ENCOUNTER — OFFICE VISIT (OUTPATIENT)
Dept: SURGERY | Age: 39
End: 2019-10-11

## 2019-10-11 VITALS
RESPIRATION RATE: 20 BRPM | WEIGHT: 287 LBS | TEMPERATURE: 97.2 F | SYSTOLIC BLOOD PRESSURE: 133 MMHG | HEART RATE: 84 BPM | DIASTOLIC BLOOD PRESSURE: 89 MMHG | HEIGHT: 68 IN | BODY MASS INDEX: 43.5 KG/M2

## 2019-10-11 DIAGNOSIS — I10 ESSENTIAL HYPERTENSION: ICD-10-CM

## 2019-10-11 DIAGNOSIS — E66.01 MORBID OBESITY (HCC): Primary | ICD-10-CM

## 2019-10-11 NOTE — PROGRESS NOTES
Initial Consultation for Bariatric Surgery Template    Ernestina Arredondo is a 42 y.o. female who comes into the office today for initial consultation for the surgical options for the treatment of morbid obesity. The patient initially identified obesity at the age of 10 and at age 27 weighed 220 lbs. She has tried a variety of unsupervised weight-loss attempts including self imposed, Weight Watchers, Medi-Fast and phentermine, but has yet to meet with lasting success. Maximum weight lost on a diet is about 40  lbs, but that the weight loss always seems to return. Today, the patient is  Height: 5' 8\" (172.7 cm) tall, Weight: 130.2 kg (287 lb) lbs for a Body mass index is 43.64 kg/m². It is due to the patient's severe obesity, which is further complicated by hypertension, reactive airway disease and iron def anemia  that the patient is now seeking out bariatric surgery, specifically, sleeve gastrectomy. Past Medical History:   Diagnosis Date    Anemia     receives iron infusions    Asthma     Hypertension        Past Surgical History:   Procedure Laterality Date    HX  SECTION      HX CHOLECYSTECTOMY         Current Outpatient Medications on File Prior to Visit   Medication Sig Dispense Refill    verapamil (CALAN) 120 mg tablet Take 120 mg by mouth three (3) times daily. No current facility-administered medications on file prior to visit.         Allergies   Allergen Reactions    Iron Other (comments)     Burning per pt reports    Iron Other (comments)     Body feels like on fire    Zoloft [Sertraline] Other (comments)     Burning per pt reports       Social History     Tobacco Use    Smoking status: Former Smoker     Last attempt to quit: 10/11/2000     Years since quittin.0    Smokeless tobacco: Never Used   Substance Use Topics    Alcohol use: No    Drug use: No       Family History   Problem Relation Age of Onset    Stroke Mother     Obesity Father     Cancer Father cancer  throat    Cancer Maternal Grandmother        Family Status   Relation Name Status    Mother  Alive    Father  Alive    [place]  (Not Specified)       Review of Systems:  Positive in BOLD    CONST: Fever, weight loss, fatigue or chills  GI: Nausea, vomiting, abdominal pain - chronic - workup with scopes negative per patient, change in bowel habits, hematochezia, melena, and GERD   INTEG: Dermatitis, abnormal moles  HEENT: Recent changes in vision, vertigo, epistaxis, dysphagia and hoarseness  CV: Chest pain, palpitations, HTN, edema and varicosities  RESP: Cough, shortness of breath, wheezing, hemoptysis, snoring and reactive airway disease  : Hematuria, dysuria, frequency, urgency, nocturia and stress urinary incontinence   MS: Weakness, joint pain and arthritis  ENDO: Diabetes, thyroid disease, polyuria, polydipsia, polyphagia, poor wound healing, heat intolerance, cold intolerance  LYMPH/HEME: Anemia, bruising and history of blood transfusions - 2012  NEURO: Dizziness, headache, fainting, seizures and stroke  PSYCH: Anxiety and depression      Physical Exam    Visit Vitals  /89 (BP 1 Location: Left arm, BP Patient Position: At rest)   Pulse 84   Temp 97.2 °F (36.2 °C) (Oral)   Resp 20   Ht 5' 8\" (1.727 m)   Wt 130.2 kg (287 lb)   BMI 43.64 kg/m²       Pre op weight: 287  EBW: 145  Wt loss to date: 0       General: 42 y.o.) female in no acute distress. Morbidly obese in abdomen and hips - gynecoid pattern  HEENT: Normocephalic, atraumatic, Pupils equal and reactive, nasopharynx clear, oropharynx clear and moist without lesions  NECK: Supple, no lymphadenopathy, thyromegaly, carotid bruits or jugular venous distension. trachea midline  RESP: Clear to auscultation bilaterally, no wheezes, rhonchi, or rales, normal respiratory excursion  CV: Regular rate and rhythm, no murmurs, rubs or gallops. 3+/4 pulses in bilateral dorsalis pedis and posterior tibialis. No distal edema or varicosities.   ABD: Soft, nontender, nondistended, normoactive bowel sounds, no hernias, no hepatosplenomegaly, easily palpable costal margins, gynecoid distribution, healed lap and pfannenstiel incisions  Extremities: Warm, well perfused, no tenderness or swelling, normal gait/station  Neuro: Sensation and strength grossly intact and symmetrical  Psych: Alert and oriented to person, place, and time. Impression:    Simin Amos is a 42 y.o. female who is suffering from morbid obesity with a BMI of 44  and comorbidities including hypertension  who would benefit from bariatric surgery. We have had an extensive discussion with regard to the risks, benefits and likely outcomes of the operation. We've discussed the restrictive and malabsorptive nature of the gastric bypass and compared and contrasted with the sleeve gastrectomy. The patient understands the likelihood of losing approximately 80% of their excess weight in 12 to 18 months. She also understands the risks including but not limited to bleeding, infection, need for reoperation, ulcers, leaks and strictures, bowel obstruction secondary to adhesions and internal hernias, DVT, PE, heart attack, stroke, and death. Patient also understands risks of inadequate weight loss, excess weight loss, vitamin insufficiency, protein malnutrition, excess skin, and loss of hair. We have reviewed the components of a successful postoperative course including requirement for a high protein, low carbohydrate diet, 60 oz a day of zero calorie liquids, daily vitamin supplementation, daily exercise, regular follow-up, and participation in support groups.  At this time she does not want surgery and desires to try medical weight loss instead

## 2019-10-11 NOTE — LETTER
10/11/19 Patient: Gregory Pederson YOB: 1980 Date of Visit: 10/11/2019 Zuleika Porterma 
[address] [facility] 
[address] [facility] [address] [address] VIA Facsimile: [phone] Dear TIMMY Porter, Thank you for referring Ms. Paris Triana to [place] [address] for evaluation. My notes for this consultation are attached. If you have questions, please do not hesitate to call me. I look forward to following your patient along with you.  
 
 
Sincerely, 
 
Cristine Shannon MD

## 2019-10-11 NOTE — PROGRESS NOTES
Nikita Lagos is a 42 y.o. female who presents today with   Chief Complaint   Patient presents with    Morbid Obesity     Consult        Body mass index is 43.64 kg/m². 1. Have you been to the ER, urgent care clinic since your last visit? Hospitalized since your last visit? No    2. Have you seen or consulted any other health care providers outside of the [address] [place] [address] since your last visit? Include any pap smears or colon screening.  No

## 2019-10-23 ENCOUNTER — HOSPITAL ENCOUNTER (OUTPATIENT)
Dept: MRI IMAGING | Age: 39
Discharge: HOME | End: 2019-10-23
Attending: PODIATRIST
Payer: MEDICAID

## 2019-10-23 DIAGNOSIS — M24.571 CONTRACTURE OF RIGHT ANKLE: ICD-10-CM

## 2019-10-23 PROCEDURE — 73721 MRI JNT OF LWR EXTRE W/O DYE: CPT

## 2019-11-15 ENCOUNTER — OFFICE VISIT (OUTPATIENT)
Dept: SURGERY | Age: 39
End: 2019-11-15

## 2019-11-15 DIAGNOSIS — E66.9 OBESITY, UNSPECIFIED CLASSIFICATION, UNSPECIFIED OBESITY TYPE, UNSPECIFIED WHETHER SERIOUS COMORBIDITY PRESENT: Primary | ICD-10-CM

## 2019-11-15 NOTE — PROGRESS NOTES
Patient attended a Medically Supervised Weight Loss New Patient Orientation today where we discussed:  - New Direction Very Low Calorie Diet details  - Medical Supervision  - Nutrition education  - Cost of Meal Replacements  - Policies and compliance required for program enrollment.      Patients initial consultation with provider is tentatively scheduled for:  Future Appointments   Date Time Provider Mariya Solorzano   12/3/2019  9:30 AM Ana Santiago NP [facility] Howard Luis
No

## 2020-07-16 ENCOUNTER — HOSPITAL ENCOUNTER (OUTPATIENT)
Dept: GENERAL RADIOLOGY | Age: 40
Discharge: HOME | End: 2020-07-16
Payer: MEDICAID

## 2020-07-16 DIAGNOSIS — M54.9 BACK PAIN: ICD-10-CM

## 2020-07-16 PROCEDURE — 72114 X-RAY EXAM L-S SPINE BENDING: CPT

## 2021-01-19 ENCOUNTER — TRANSCRIBE ORDER (OUTPATIENT)
Dept: SCHEDULING | Age: 41
End: 2021-01-19

## 2021-01-19 DIAGNOSIS — Z12.31 VISIT FOR SCREENING MAMMOGRAM: Primary | ICD-10-CM

## 2021-02-12 ENCOUNTER — HOSPITAL ENCOUNTER (OUTPATIENT)
Dept: MAMMOGRAPHY | Age: 41
Discharge: HOME | End: 2021-02-12
Attending: PHYSICIAN ASSISTANT
Payer: MEDICAID

## 2021-02-12 DIAGNOSIS — Z12.31 VISIT FOR SCREENING MAMMOGRAM: ICD-10-CM

## 2021-02-12 PROCEDURE — 77063 BREAST TOMOSYNTHESIS BI: CPT

## 2021-08-05 ENCOUNTER — OFFICE VISIT (OUTPATIENT)
Dept: SURGERY | Age: 41
End: 2021-08-05
Payer: MEDICAID

## 2021-08-05 VITALS
HEART RATE: 68 BPM | DIASTOLIC BLOOD PRESSURE: 75 MMHG | HEIGHT: 68 IN | BODY MASS INDEX: 41.01 KG/M2 | TEMPERATURE: 97.9 F | SYSTOLIC BLOOD PRESSURE: 117 MMHG | OXYGEN SATURATION: 99 % | WEIGHT: 270.6 LBS

## 2021-08-05 DIAGNOSIS — J45.909 REACTIVE AIRWAY DISEASE WITHOUT COMPLICATION, UNSPECIFIED ASTHMA SEVERITY, UNSPECIFIED WHETHER PERSISTENT: ICD-10-CM

## 2021-08-05 DIAGNOSIS — M25.50 ARTHRALGIA, UNSPECIFIED JOINT: ICD-10-CM

## 2021-08-05 DIAGNOSIS — I10 ESSENTIAL HYPERTENSION: ICD-10-CM

## 2021-08-05 DIAGNOSIS — E66.01 MORBID OBESITY (HCC): Primary | ICD-10-CM

## 2021-08-05 DIAGNOSIS — R35.0 FREQUENCY OF URINATION: ICD-10-CM

## 2021-08-05 PROCEDURE — 99215 OFFICE O/P EST HI 40 MIN: CPT | Performed by: NURSE PRACTITIONER

## 2021-08-05 NOTE — PROGRESS NOTES
Consultation for Bariatric Surgery  Rosalina Horta is a 46 y.o. female who comes into the office today for initial consultation for the surgical options for the treatment of morbid obesity. The patient initially identified obesity at the age of 10 and at age 27 weighed 220 lbs. She has tried a variety of unsupervised weight-loss attempts including self imposed, Weight Watchers, Medi-Fast and phentermine, but has yet to meet with lasting success. Maximum weight lost on a diet is about 10-20 lbs, but that the weight loss always seems to return. Today, the patient is  Height: 5' 8\" (172.7 cm) tall, Weight: 122.7 kg (270 lb 9.6 oz) lbs for a Body mass index is 41.14 kg/m². It is due to the patient's severe obesity, which is further complicated by hypertension, reactive airway disease and iron def anemia  that the patient is now seeking out bariatric surgery, specifically, undecided. Diet recall:  B:Oatmeal   L: ? skip  D: meat loaf, veg     Volume eating, long periods of not eating, denies emotional eating, CHO snacking. Past Medical History:   Diagnosis Date    Anemia     receives iron infusions    Asthma     Hypertension        Past Surgical History:   Procedure Laterality Date    HX  SECTION      HX CHOLECYSTECTOMY       Current Outpatient Medications   Medication Sig Dispense Refill    verapamil (CALAN) 120 mg tablet Take 120 mg by mouth three (3) times daily.        Allergies   Allergen Reactions    Iron Other (comments)     Burning per pt reports    Iron Other (comments)     Body feels like on fire    Zoloft [Sertraline] Other (comments)     Burning per pt reports     Social History     Tobacco Use    Smoking status: Former Smoker     Quit date: 10/11/2000     Years since quittin.8    Smokeless tobacco: Never Used   Substance Use Topics    Alcohol use: No    Drug use: No     Family History   Problem Relation Age of Onset    Stroke Mother     Obesity Father     Cancer Father         cancer  throat    Cancer Maternal Grandmother     Breast Cancer Maternal Grandmother         63's     Family Status   Relation Name Status    Mother  Alive    Father  Alive    [place]  (Not Specified)     Review of Systems:  Positive in BOLD  CONST: Fever, weight loss, fatigue or chills  GI: Nausea, vomiting, abdominal pain, change in bowel habits, hematochezia, melena, and GERD, IBS   INTEG: Dermatitis, abnormal moles  HEENT: Recent changes in vision, vertigo, epistaxis, dysphagia and hoarseness, \"changes to equilibrium no LOC\"   CV: Chest pain, palpitations, HTN, edema and varicosities  RESP: Cough, shortness of breath, wheezing, hemoptysis, snoring and reactive airway disease  : Hematuria, dysuria, frequency, urgency, nocturia and stress urinary incontinence   MS: Weakness, joint pain and arthritis  ENDO: Diabetes, thyroid disease, polyuria, polydipsia, polyphagia, poor wound healing, heat intolerance, cold intolerance  LYMPH/HEME: Anemia, bruising and history of blood transfusions -2012  NEURO: Dizziness, headache, fainting, seizures and stroke, fibromyalgia  PSYCH: Anxiety and depression      Physical Exam    Visit Vitals  /75 (BP 1 Location: Right arm, BP Patient Position: Sitting, BP Cuff Size: Large adult)   Pulse 68   Temp 97.9 °F (36.6 °C) (Temporal)   Ht 5' 8\" (1.727 m)   Wt 122.7 kg (270 lb 9.6 oz)   LMP 07/05/2021 (Exact Date)   SpO2 99%   BMI 41.14 kg/m²           General: 46 y.o.) female in no acute distress. Morbidly obese BMI 41.4  HEENT: Normocephalic, atraumatic, Pupils equal and reactive, nasopharynx clear, oropharynx clear and moist without lesions  NECK: Supple, no lymphadenopathy, thyromegaly, carotid bruits or jugular venous distension. trachea midline  RESP: Clear to auscultation bilaterally, no wheezes, rhonchi, or rales, normal respiratory excursion  CV: Regular rate and rhythm, no murmurs, rubs or gallops.  3+/4 pulses in bilateral dorsalis pedis and posterior tibialis. No distal edema or varicosities. ABD: Soft, nontender, nondistended, normoactive bowel sounds, no hernias, no hepatosplenomegaly, minimally palpable costal margins  Extremities: Warm, well perfused, no tenderness or swelling, normal gait/station  Neuro: Sensation and strength grossly intact and symmetrical  Psych: Alert and oriented to person, place, and time. Impression:  Cirilo Bauer is a 46 y.o. female who is suffering from morbid obesity with a BMI of 41.14  and comorbidities including hypertension and reactive airway disease  who would benefit from bariatric surgery. We have had an extensive discussion with regard to the risks, benefits and likely outcomes of the operation. We've discussed the restrictive and malabsorptive nature of the gastric bypass and compared and contrasted with the sleeve gastrectomy. The patient understands the likelihood of losing approximately 80% of their excess weight in 12 to 18 months. She also understands the risks including but not limited to bleeding, infection, need for reoperation, ulcers, leaks and strictures, bowel obstruction secondary to adhesions and internal hernias, DVT, PE, heart attack, stroke, and death. Patient also understands risks of inadequate weight loss, excess weight loss, vitamin insufficiency, protein malnutrition, excess skin, and loss of hair. We have reviewed the components of a successful postoperative course including requirement for a high protein, low carbohydrate diet, 64 oz a day of zero calorie liquids, daily vitamin supplementation, daily exercise, regular follow-up, and participation in support groups. At this time we will enroll the patient in our bariatric program, undertake routine laboratory evaluation, chest X-ray, EKG, possible UGI and evaluation by  nutritionist as well as psychologist and pending their satisfactory completion of the preop evaluation, plan to perform a undecided.     The patient clearly understands that they have to complete the duration of WLT consecutively and if they miss a month restart will be required.  Additionally, no showing the RD appointments may result in removal from the surgical weight loss program.     Will need midtrail with surgeon, discussed annual screenings -- patient will provide copy of mammo, pap, and colo as appropriate     Signed By: Shea Castro NP     August 11, 2021

## 2021-08-05 NOTE — PROGRESS NOTES
Lucia Cavazos is a 46 y.o. female (: 1980) presenting to address:    Chief Complaint   Patient presents with    Follow-up     Re-enroll in surgical weight loss       Medication list and allergies have been reviewed with Lucia Cavazos and updated as of today's date. I have gone over all Medical, Surgical and Social History with Lucia Cavazos and updated/added the information accordingly. 1. Have you been to the ER, Urgent Care or Hospitalized since your last visit? NO      2. Have you followed up with your PCP or any other Physicians since your procedure/ last office visit? YES.  Routine care

## 2021-08-12 ENCOUNTER — DOCUMENTATION ONLY (OUTPATIENT)
Dept: BARIATRICS/WEIGHT MGMT | Age: 41
End: 2021-08-12

## 2021-08-12 ENCOUNTER — HOSPITAL ENCOUNTER (OUTPATIENT)
Dept: BARIATRICS/WEIGHT MGMT | Age: 41
Discharge: HOME | End: 2021-08-12

## 2021-08-12 NOTE — PROGRESS NOTES
[address] [place] Loss Bette Zaragoza [address] [facility], [address]    Patient's Name: Ana Denver   Age: 46 y.o. YOB: 1980   Sex: female        Insurance:            Session: 1 of 6  Revision:   Surgeon:  Dr. Ramonita Palacios    Height: 5 f8 Weight:    271      Lbs. BMI: 41.3   Pounds Lost since last month: 0               Pounds Gained since last month: 0      Starting Weight: 271   Previous Months Weight: 271  Overall Pounds Lost: 0 Overall Pounds Gained: 0      Do you smoke? Patient does not smoke    Alcohol intake:  Number of drinks at a time:  Patient does not drink alcohol  Number of times a week:     Class Guidelines    Guidelines are reviewed with patient at the start of every class. 1. Patient understands that weight loss trial classes must be consecutive. Patient understands if they miss a class, it is their responsibility to contact me to reschedule class. I will reach out to patient after their first no show. 2.  Patient understands the expectations that weight maintenance/weight loss is expected during the classes. Failure to demonstrate changes may result in one extra month of weight loss trial, followed by going back to see the surgeon. Patient understands that they CAN NOT gain any weight during the weight loss trial.  Gaining weight will result in extra classes. 3. Patient is also instructed to be doing their labs, blood work, psych visit, support group and any other test that the surgeon has used while they are working on their weight loss trial.  4.  Patient was instructed to bring their blue binder to every class and appointment. Other Pertinent Information:     Changes Made Since Last Class: Not eating as much junk food. Eating Habits and Behaviors    Today in class, we reviewed the key diet principles. I have talked to patient about pushing the fluid and working towards 64 ounces per day.   We focused on following a low-calorie diet. Patient was instructed to count their carbohydrates and try to keep their daily intake under 75 grams per day and try to keep their daily protein at 80 grams per day. Patient was given examples of carbohydrates in starches. Patient was encouraged to focus on meat and vegetables and begin cutting carbohydrates out. We talked about foods that are protein-based and how to incorporate those into their meals. I also reviewed with patient the importance of eating 3 meals per day and suggestions were made for breakfast items. Patient's current diet habits include: 2 meals per day. Patient is doing a slim fast or a protein drink for breakfast.  Lunch is a fruit bowl. Dinner is salmon, veggies, mashed potatoes. She states she is snacking 2 x a day. She states she may have cookies 2 x a week, but is working on cutting those out. Physical Activity/Exercise    Comments: We talked about exercise. Patient was given reasons of why exercise is so important and how that can help with their long-term success. I have encouraged patient to get a support system to help with the activity. Currently for activity, patient is doing a lot of walking and states she is getting above her normal walking routine. Behavior Modification       Comments:  During today's lesson, I gave a presentation called The 100-200 Calorie \"Mindless Margin. \"  The goal is to make modest daily 100-200 calorie reductions in certain things that the body won't notice. One, 100-200 calorie change and would will look 10-20 pounds in one year. An example could be cutting soda. Patient was given a check off list and was encouraged to come up with 1-3 100 calories changes they could make. The check off list is a daily tracker to see if these goals are being met. Goals that patient wants to work on includes:  1. Cut back on crackers and cookies. 2. Increase activity.       Tisha Braga MS RD  8/12/2021

## 2021-08-20 ENCOUNTER — HOSPITAL ENCOUNTER (OUTPATIENT)
Dept: GENERAL RADIOLOGY | Age: 41
Discharge: HOME | End: 2021-08-20
Attending: NURSE PRACTITIONER
Payer: MEDICAID

## 2021-08-20 DIAGNOSIS — J45.909 REACTIVE AIRWAY DISEASE WITHOUT COMPLICATION, UNSPECIFIED ASTHMA SEVERITY, UNSPECIFIED WHETHER PERSISTENT: ICD-10-CM

## 2021-08-20 DIAGNOSIS — E66.01 MORBID OBESITY (HCC): ICD-10-CM

## 2021-08-20 DIAGNOSIS — R35.0 FREQUENCY OF URINATION: ICD-10-CM

## 2021-08-20 DIAGNOSIS — I10 ESSENTIAL HYPERTENSION: ICD-10-CM

## 2021-08-20 DIAGNOSIS — M25.50 ARTHRALGIA, UNSPECIFIED JOINT: ICD-10-CM

## 2021-08-20 PROCEDURE — 74246 X-RAY XM UPR GI TRC 2CNTRST: CPT

## 2021-08-20 PROCEDURE — 74011000250 HC RX REV CODE- 250: Performed by: NURSE PRACTITIONER

## 2021-08-20 RX ADMIN — BARIUM SULFATE 135 ML: 980 POWDER, FOR SUSPENSION ORAL at 11:05

## 2021-08-20 RX ADMIN — ANTACID/ANTIFLATULENT 8 G: 380; 550; 10; 10 GRANULE, EFFERVESCENT ORAL at 11:04

## 2021-08-20 RX ADMIN — BARIUM SULFATE 176 G: 960 POWDER, FOR SUSPENSION ORAL at 11:05

## 2021-08-23 NOTE — PROGRESS NOTES
Spontaneous gastroesophageal reflux was demonstrated to the level of the thoracic inlet, risk after sleeve for GERD to worsen, already relativity high level of reflux will need to discuss with surgeon if she can move forward with the sleeve as an option. May set up an appt to discuss or discuss at midtral appt that is already set.

## 2021-08-26 NOTE — PROGRESS NOTES
Contacted patient and verified identity using name and date of birth (2- identifiers)  Discussed Upper GI results with patient per provider \"Spontaneous gastroesophageal reflux was demonstrated to the level of the thoracic inlet, risk after sleeve for GERD to worsen, already relativity high level of reflux will need to discuss with surgeon if she can move forward with the sleeve as an option. May set up an appt to discuss or discuss at midtral appt that is already set. \" Pt stated she would leave as is and discuss with Dr. Chanel Álvarez at appointment on 11/11/21 at 1130am.

## 2021-09-09 ENCOUNTER — DOCUMENTATION ONLY (OUTPATIENT)
Dept: BARIATRICS/WEIGHT MGMT | Age: 41
End: 2021-09-09

## 2021-09-09 ENCOUNTER — HOSPITAL ENCOUNTER (OUTPATIENT)
Dept: BARIATRICS/WEIGHT MGMT | Age: 41
Discharge: HOME | End: 2021-09-09

## 2021-09-09 NOTE — PROGRESS NOTES
[address] [place] Loss Bette Zaragoza [address] [facility], [address]    Patient's Name: Blade Villarreal   Age: 46 y.o. YOB: 1980   Sex: female    Date:   9/9/2021    Insurance:              Session: 2 of  6  Surgeon:  Dr. Triston Villanueva    Height: 5 f 8   Weight:    264      Lbs. BMI: 40.2   Pounds Lost since last month: 7                Pounds Gained since last month: 0    Starting Weight: 271     Previous Months Weight: 271  Overall Pounds Lost: 7   Overall Pounds Gained: 0    Smoking:  None    Alcohol intake:  Number of drinks at a time:  Maybe 1 x a month  Number of times a week:     Class Guidelines    Guidelines are reviewed with patient at the start of every class. 1. Patient understands that weight loss trial classes must be consecutive. Patient understands if they miss a class, it is their responsibility to contact me to reschedule class. I will reach out to patient after their first no show. 2.  Patient understands the expectations that weight maintenance/weight loss is expected during the classes. Failure to demonstrate changes may result in one extra month of weight loss trial, followed by going back to see the surgeon. 3. Patient is also instructed to be doing their labs, blood work, psych visit, support group and any other test that the surgeon has used while they are working on their weight loss trial.    Other Pertinent Information:     Changes Made Since Last Class: Cut out a lot of carbohydrates    Eating Habits and Behaviors      During today's class, we continued to focus on the key diet principles. Patient was instructed to follow a low carbohydrate diet, focusing on meat and vegetables. Patient was instructed to stop liquid calories and aim for 64 ounces of water per day.  We focused on focusing in on bigger problem areas to start making changes to, such as reducing fast food intake, reducing carbonated beverages/soda intake, decreasing carbohydrates intake daily, etc. We reviewed protein shakes and high protein yogurts to chose, as well. During today's class, we also talked about how to read a label. Patient was given information on:  1. The benefits of reading a label, which allowed one to compare the nutritional value of similar products and make healthy food decisions. 2. The ingredient list, which can help to determine if the food is heathy or something that fits into the diet. 3. The importance of reading the serving size and making sure to apply that to the portion size that they are consuming. Patient was also educated on carbohydrates. I talked to patient about:  1. The function of carbohydrates. 2. Foods that carbohydrate-heavy. 3. Patient was given the guidelines to keep their carbohydrates less than 75 grams per day in the pre-op phase. 4. Patient was also given ideas of low carb swaps, which include zucchini noodles, spaghetti squash, or cauliflower rice. 5. Lower carbohydrate fruit options were discussed. 6. Discussed lower carb swaps to use instead of potato chips. Patient's dietary habits include: Patient is eating 2-3 meal per day. Meals are made up of omelette with cheese, tuna, lettuce wrap, meat and vegetables. Portions are:  Dinner plate. Patient is eating out: 1 x a week. Patient is snacking on green peppers, fruits, cucumbers. I have talked to patient about some lower carbohydrate snack choices that focused more protein. Patient is drinking 64 ounces of fluid per day. Fluid intake is make up of: water. Physical Activity/Exercise     Comments:     Currently for exercise, patient 3 days of exercising and a rest day. I have talked to patient about some suggestions to start an exercise routine. Patient is encouraged to purchase a pedometer and use this to track her steps. I have made some suggestions to patient of ways to incorporate exercise in with a busy lifestyle. We also talked about You Tube videos that can be used for an exercise routine. Behavior Modification  Comments:  Behavior modifications were discussed with the patient. Some of those behavior modifications include:  1. Emphasized the importance of eating slowly, not eating and drinking meals at the same time. 2.  Taking 20-30 minutes to eat a meal  3. I have encouraged patient to follow journal, which may be done by paper or tracking it an larisa, such as My [employer]itness Pal or [employer]. [address]. Patient understands the importance of following through with these behaviors following surgery to aid in long term weight loss. Tips and advice were given on how to start implementing these into the patient's life. Patient has not attended the required bariatric support group. Goal patient has set for next month:  1. Finish the challenges and start walking  2. Keep tweaking foods.     Wang Kamara Luis Fernando 87 RD  9/9/2021

## 2021-10-08 ENCOUNTER — HOSPITAL ENCOUNTER (OUTPATIENT)
Dept: BARIATRICS/WEIGHT MGMT | Age: 41
Discharge: HOME | End: 2021-10-08

## 2021-10-08 ENCOUNTER — DOCUMENTATION ONLY (OUTPATIENT)
Dept: BARIATRICS/WEIGHT MGMT | Age: 41
End: 2021-10-08

## 2021-10-08 NOTE — PROGRESS NOTES
[address] Loss Bette CONOR Nik [address] [facility], [address]    Patient's Name: Rain Nance   Age: 46 y.o. YOB: 1980   Sex: female      Insurance:              Session: 3 of  6  Surgeon:  Dr. Maya Dee    Height: 5 f 8 Weight:    259      Lbs. BMI:    Pounds Lost since last month: 5               Pounds Gained since last month: 0    Starting Weight: 271   Previous Months Weight: 264  Overall Pounds Lost: 12 Overall Pounds Gained: 0      Do you smoke? None    Alcohol intake:  Number of drinks at a time:  None  Number of times a week: None    Class Guidelines    Guidelines are reviewed with patient at the start of every class. 1. Patient understands that weight loss trial classes must be consecutive. Patient understands if they miss a class, it is their responsibility to contact me to reschedule class. I will reach out to patient after their first no show. 2.  Patient understands the expectations that weight maintenance/weight loss is expected during the classes. Failure to demonstrate changes may result in one extra month of weight loss trial, followed by going back to see the surgeon. 3. Patient is also instructed to be doing their labs, blood work, psych visit, support group and any other test that the surgeon has used while they are working on their weight loss trial.  4. Patient is instructed to bring their education binder to all classes. Changes Made Since Last Class: Less carbohydratres    Eating Habits and Behaviors      Today we reviewed key diet principles. We talked about patient following a low calorie/low carbohydrate diet while they are in weight loss trials. To achieve this, patient is encouraged to avoid liquid calories, including alcohol, soda, sweet tea, and fruit juices. Patient can cut carbohydrates by trying to stick to meat and vegetables.   Patient can also eat eggs, cheese, and good fat, while trying to eliminate starches, such as pasta, rice, crackers, chips, popcorn. I also gave a power point that included 21 Ways to Stay on Track with a Healthy Lifestyle. Some of the food-related suggestions included drinking plenty of water or calorie-free beverages prior to their meal.  Patient is encouraged to to fill up on protein first, which is the ultimate fill-me up food. We talked about the importance of eating breakfast and the effects that can happen if one skips meals, which includes eating larger portions, snacking more, and decreasing their metabolism. With the suggestions in the power point, patient will be able to decrease their calories and carbohydrate intake. Patient's dietary habits include: Patient is eating 2-3 meals per day. Meals are made up of boiled eggs, tuna or cheese, salmon, chicken or brussel sprouts. Portions are:  Dinner size plate. Patient is eating out: 2-3 x a month. Patient's intake of bread, rice, pasta or other carbohydrates is:  1-2 x a week. Patient is snacking on keto cookies. Patient is eating sweets 1-2 times a month. I have talked to patient about some lower carbohydrate snack choices that focused more protein. Patient is drinking unsure ounces of fluid per day. Fluid intake is make up of: water. Physical Activity/Exercise    Comments: We talked about the importance of establishing a work out routine. Patient is currently walking for activity. Goals have been set. Behavior Modification       Comments:   Some of the behavior tips that were included in the power point, include being choosy about night time snacking. Patient was encouraged to make the TV a no eating zone and not eat after 7 pm.  Patient is also encouraged to keep a food journal.      One of the other things we talked about during class is whether or not patient has a support system. Patient has not been to a support group.       Goals set by Registered Dietitian:  1. Be more active  2. Eat smaller portions.     Wang Spaulding Luis Fernando 87 RD  10/8/2021

## 2021-10-13 ENCOUNTER — CLINICAL SUPPORT (OUTPATIENT)
Dept: SURGERY | Age: 41
End: 2021-10-13

## 2021-10-13 DIAGNOSIS — Z01.818 PREOP EXAMINATION: Primary | ICD-10-CM

## 2021-10-13 NOTE — PROGRESS NOTES
Patient arrived to office for H pylori breath test for Bariatric trial.  Patient tolerated test very well.

## 2021-10-16 LAB — UREA BREATH TEST QL: NEGATIVE

## 2021-11-11 ENCOUNTER — DOCUMENTATION ONLY (OUTPATIENT)
Dept: BARIATRICS/WEIGHT MGMT | Age: 41
End: 2021-11-11

## 2021-11-11 ENCOUNTER — OFFICE VISIT (OUTPATIENT)
Dept: SURGERY | Age: 41
End: 2021-11-11
Payer: MEDICAID

## 2021-11-11 ENCOUNTER — HOSPITAL ENCOUNTER (OUTPATIENT)
Dept: BARIATRICS/WEIGHT MGMT | Age: 41
Discharge: HOME | End: 2021-11-11

## 2021-11-11 VITALS
WEIGHT: 266 LBS | DIASTOLIC BLOOD PRESSURE: 79 MMHG | SYSTOLIC BLOOD PRESSURE: 127 MMHG | HEART RATE: 76 BPM | HEIGHT: 68 IN | RESPIRATION RATE: 20 BRPM | BODY MASS INDEX: 40.32 KG/M2 | TEMPERATURE: 98.4 F

## 2021-11-11 PROCEDURE — 99214 OFFICE O/P EST MOD 30 MIN: CPT | Performed by: SURGERY

## 2021-11-11 NOTE — PROGRESS NOTES
Júnior Lou is a 46 y.o. female who presents today with   Chief Complaint   Patient presents with    Morbid Obesity     Mid trial                Body mass index is 40.45 kg/m². 1. Have you been to the ER, urgent care clinic since your last visit? Hospitalized since your last visit? No    2. Have you seen or consulted any other health care providers outside of the [address] [place] [address] since your last visit? Include any pap smears or colon screening.  No

## 2021-11-11 NOTE — PROGRESS NOTES
[address] [facility] [address], [address]    Patient's Name: Bernard Lovett   Age: 46 y.o. YOB: 1980   Sex: female      Insurance:            Session: 4 of 6  Revision:   Surgeon:  Dr. Lizett Rudolph    Height: 5 f 8 Weight:    266      Lbs. BMI: 40.5   Pounds Lost since last month: 0               Pounds Gained since last month: 7    Starting Weight: 271   Previous Months Weight: 259  Overall Pounds Lost: 15 Overall Pounds Gained: 0      Do you smoke? Was vaping, but stopped    Alcohol intake:  Number of drinks at a time:  None  Number of times a week: None    Class Guidelines    Guidelines are reviewed with patient at the start of every class. 1. Patient understands that weight loss trial classes must be consecutive. Patient understands if they miss a class, it is their responsibility to contact me to reschedule class. I will reach out to patient after their first no show. 2.  Patient understands the expectations that weight maintenance/weight loss is expected during the classes. Failure to demonstrate changes may result in one extra month of weight loss trial, followed by going back to see the surgeon. 3. Patient is also instructed to be doing their labs, blood work, psych visit, support group and any other test that the surgeon has used while they are working on their weight loss trial.    Other Pertinent Information:     Changes Made Since Last Class: Less carbohydrates      Dietary Instruction    During today's class we continued to focus on the key diet principles. Patient was instructed to follow a low carbohydrate diet, focusing on meat and vegetables. Patient was instructed to stop liquid calories and aim for 64 ounces of water per day. In class, I also gave patient a power point on surviving the holidays.   Some of the tips included survival tips for parties, including bringing their own low carbohydrate dish to a potluck dinner and surveying the buffet line before they start filling up their plate. Patient was given cooking alternatives, including using Splenda for sugar, substituting applesauce for oil in recipes, and using low fat plain yogurt instead of sour cream in dips. Patient was also encouraged to be mindful of calories in alcohol. Patient is eating 2-3 meals per day. Patient states their last meal or snack of the day is at 6 pm and they eat within 1-2 hours after waking up in the morning. Patient is encouraged to eat within 1 hour of waking up and have a cut off time in the evening. If patient is physically hungry, it is encouraged to have a protein-based snack. Patient feels that their meals are: low in carbohydrates. In terms of managing portions, patient just eating less overall. I have made suggestions to use a smaller plate or to fill up on water before eating a meal.  Patient is eating out 2-3 x a month. Choices when eating out include:  Meat, vegetable, soup. Patient is eating bread, rice, pasta, crackers, etc. 2-3 x a month. Patient is snacking on: cheese, raw vegetables or fruit. Sweet intake is 1 x a monmth. We talked about dumping syndrome and the effects of eating sugar. Patient is drinking 64 ounces of water, 0 ounces of soda, 0 ounces of sweet tea, 0 ounces of fruit juices, and 0 ounces of caffeine. We talked about not drinking any liquid calories. 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 Today's power point on surviving the holidays also included tips on exercising. This included being creative during the holiday, walking stairs, mall walking, getting resistance bands. Patient was encouraged not to be afraid to excuse themselves from the table to go for a walk after they eat. Comments:     Behavior Modification    Reinforced behavior changes to make.   Patient was encouraged to keep their emotions in check. Try to HALT and focus on whether they are eating out of hunger or if they are eating out of emotions. Other eating behaviors included surveying the buffet line before starting to fill up their plate. Patient was given a check off list and encouraged to monitor some of their eating behaviors, such as eating slowly, chewing their food thoroughly, and taking 20-30 minutes to eat a meal.    Weight loss surgery is important to the patient because:  Be around to watch her 8 kids grow*    Challenges or barriers that they may encounter with making changes after surgery are? She states her challenge is that she has 8 children and will often eat what they have. Patient has not been to a support group meeting. Non-Scale that patient set for next month include:  1. Be more consistent with water drinking  2. Smaller portions.       Wang Whitfield Luis Fernando 87 RD  11/11/2021

## 2021-11-11 NOTE — PROGRESS NOTES
Bariatric Follow-Up Evaluation    Ernestina Elena is a 46 y.o. white female initially evaluated Dr. Padmaja Clark in October 2012 for discussion of the surgical treatment of her clinically severe obesity. The patient that time weighed 278 pounds with a body mass index of 41. The patient subsequently was seen by Dr. Jaskaran Thomas in March 2015 again for consideration of surgical options. 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 Patient after discussing the options initially was interested in pursuing sleeve gastrectomy however after discussing the options is elected pursue laparoscopic potentially open Lisa-en-Y gastric bypass to achieve definitive durable weight loss on a personal level expected resolution of obesity related comorbidities. The patient returns today after completing some of the multidiscipline bariatric programmatic requirements necessary prior to pursuit of surgery for her midterm evaluation noting no new medical or surgical history  Onset obesity: Childhood  Weight is 20: 220 pounds and a five 4 inch frame  Maximum weight: 300 pounds in 2011  Pattern/progression of weight gain: Slowly progressive interrupted by dietary weight loss followed by regain of lost weight as well as additional weight thus exhibiting yoyo effect after maximum weight of 300 pounds occurring in 2011  Max medical weight loss attempts: Multiple unsupervised and supervised weight loss trials of maximal loss of 15 pounds occurring in 2019 over 3 months.   Comorbidities: Hypertension, gastroesophageal reflux disease, reactive airway disease, stress urinary incontinence, clinical obstructive sleep apnea, weight related arthropathy. Ideal body weight: 42  Excess body weight: 24  Estimated postsurgical weight loss based on 8% loss of excess body weight 99  Postsurgical goal weight: 167  Allergies: Iron, Zoloft  Current medications: See medication list  Past medical history:  1. Clinically severe obesity with body mass index of 41 with obesity related comorbidities of hypertension, Gastrosoft reflux disease, reactive airway disease, stress incontinence, clinical obstructive sleep apnea, weight related arthropathy-back, feet  2. Vitamin D deficiency  3. History iron deficiency anemia status post iron infusions  Past surgical history:  1.  section   2. Laparoscopic cholecystectomy   Social history: Quit smoking at the age of 27 years smoked for 3 years approximately 1/2 pack of cigarettes daily  Alcohol: Approximately 4 to 5 ounces on a yearly basis  Family history: Mother 58-status post CVA  Father 58-history of clinically severe obesity with 2 bariatric procedures, history of throat cancer  No full siblings    Weight Loss Metrics 2021 2021 2021 10/11/2019 10/11/2019 2019 10/24/2018   Pre op / Initial Wt 266 - - 287 - - -   Today's Wt - 266 lb 270 lb 9.6 oz - 287 lb 278 lb 260 lb   BMI - 40.45 kg/m2 41.14 kg/m2 - 43.64 kg/m2 42.27 kg/m2 38.4 kg/m2   Ideal Body Wt 142 - - 142 - - -   Excess Body Wt 124 - - 145 - - -   Goal Wt 167 - - 171 - - -   Wt loss to date 0 - - 0 - - -   % Wt Loss 0 - - 0 - - -   80% EBW 99.2 - - 116 - - -       Allergies   Allergen Reactions    Iron Other (comments)     Burning per pt reports    Iron Other (comments)     Body feels like on fire    Zoloft [Sertraline] Other (comments)     Burning per pt reports       Current Outpatient Medications on File Prior to Visit   Medication Sig Dispense Refill    verapamil (CALAN) 120 mg tablet Take 120 mg by mouth three (3) times daily.        No current facility-administered medications on file prior to visit. Past Medical History:   Diagnosis Date    Anemia     receives iron infusions    Asthma     Hypertension        Past Surgical History:   Procedure Laterality Date    HX  SECTION      HX CHOLECYSTECTOMY         Social History     Tobacco Use    Smoking status: Former Smoker     Quit date: 10/11/2000     Years since quittin.0    Smokeless tobacco: Never Used   Substance Use Topics    Alcohol use: No    Drug use: No       Family History   Problem Relation Age of Onset    Stroke Mother     Obesity Father     Cancer Father         cancer  throat    Cancer Maternal Grandmother     Breast Cancer Maternal Grandmother         60's       ROS:  General: Negative for fevers, chills, night sweats, fatigue, weight loss, or weight gain. GI: Negative for abdominal pain, change in bowel habits, hematochezia, melena, or GERD. Integumentary: Negative for dermatitis or abnormal moles. HEENT: Negative for visual changes, vertigo, epistaxis, dysphagia, or hoarseness    Cardiac: Negative for chest pain, palpitations, hypertension, edema, or varicosities    Resp: Negative for cough, shortness of breath, wheezing, hemoptysis, snoring, or reactive airway disease    : Negative for hematuria, dysuria, frequency, urgency, nocturia, or stress urinary incontinence    MSK:  Negative for weakness, joint pain, or arthritis    Endocrine: Negative for diabetes, thyroid disease, polyuria, polydipsia, polyphagia, poor wound, heat intolerance, or cold intolerance. Lymph/Heme: Negative for anemia, bruising, or history of blood transfusions. Neuro:  Negative for dizziness, headache, fainting, seizures, and stroke.     Psychiatry:  Negative for anxiety or depression    Physical Exam    Visit Vitals  /79 (BP 1 Location: Left upper arm, BP Patient Position: At rest, BP Cuff Size: Adult)   Pulse 76   Temp 98.4 °F (36.9 °C) (Oral)   Resp 20   Ht 5' 8\" (1.727 m)   Wt 120.7 kg (266 lb)   BMI 40.45 kg/m²       Nursing note reviewed. General: 46 y.o. female is in no acute distress. Head: Normocephalic, atraumatic  Mouth: Clear, no overt lesions, oral mucosa is pink and moist.  Neck: Supple, no masses, no adenopathy or carotid bruits, trachea midline  Resp: Clear to auscultation bilaterally, no wheezing, rhonchi, or rales, excursions normal and symmetrical.  Cardio: Regular rate and rhythm, no murmurs, clicks, gallops, or rubs. No edema or varicosities. Abdomen: Obese, soft, nontender, nondistended, normoactive bowel sounds, no hernias, no hepatosplenomegaly,   Extremities: Warm, well perfused, no tenderness or swelling, normal gait/station   Neuro: Sensation and strength grossly intact and symmetrical.  Psych: Alert and oriented to person, place, and time. Impression    Clinic severe obesity with body mass index of 40 with obesity related comorbidities hypertension, gastroesophageal reflux disease, reactive airway disease, stress incontinence, clinical obstructive sleep apnea and weight related arthropathy-back, feet      Plan    Complete all bariatric surgery multidisciplinary programmatic requirements.   Follow-up for preop after completion of the requirements

## 2021-12-09 ENCOUNTER — DOCUMENTATION ONLY (OUTPATIENT)
Dept: BARIATRICS/WEIGHT MGMT | Age: 41
End: 2021-12-09

## 2021-12-09 ENCOUNTER — HOSPITAL ENCOUNTER (OUTPATIENT)
Dept: BARIATRICS/WEIGHT MGMT | Age: 41
Discharge: HOME | End: 2021-12-09

## 2021-12-09 NOTE — PROGRESS NOTES
[address] Loss [facility] CON[place] [facility] [address] [facility], [address]    Patient's Name: Alise Daly   Age: 46 y.o. YOB: 1980   Sex: female    Date:   12/9/2021          Session: 5 of  6   Surgeon:  Dr. Ruthann Zafar    Height: 5 f 8 Weight:    267      Lbs. BMI:    Pounds Lost since last month: 0               Pounds Gained since last month: 1    Starting Weight: 271   Previous Months Weight: 266  Overall Pounds Lost: 4 Overall Pounds Gained: 0      Do you smoke? None    Alcohol intake:  Number of drinks at a time:  None  Number of times a week: None    Class Guidelines    Guidelines are reviewed with patient at the start of every class. 1. Patient understands that weight loss trial classes must be consecutive. Patient understands if they miss a class, it is their responsibility to contact me to reschedule class. I will reach out to patient after their first no show. 2.  Patient understands the expectations that weight maintenance/weight loss is expected during the classes. Failure to demonstrate changes may result in one extra month of weight loss trial, followed by going back to see the surgeon. 3. Patient is also instructed to be doing their labs, blood work, psych visit, support group and any other test that the surgeon has used while they are working on their weight loss trial.    Other Pertinent Information:     Patient has not attended support group. Changes Made Since Last Class: Eating smaller portions    Eating Habits and Behaviors      Today we reviewed key diet principles. We talked about protein drinks and ones that would be okay. Patient was encouraged to start getting into a routine now and drinking a shake. Patient may use for a meal replacement or a snack. Patient was also encouraged to stop liquid calories. We talked about fluid choices that would be okay. We also spent a lot of time talking about carbohydrates. Patient was encouraged to start cutting their carbohydrates out and keep them less than 100 grams per day. Patient was given examples of carbohydrates that are in food. We also talked about the power of protein and the importance of getting more protein in per day than carbohydrates. I also reviewed with patient the vitamins that they will need to take post op. Patient will hear this information again at pre op class prior to surgery, but I felt it was important to prepare them now. Patient will be taking 2 multivitamin complete per day, 100 mg of Vitamin B1, 5000 IU of Vitamin D3, 1000 mcg Vitamin B12, 1500 mg of calcium citrate. We also spent some time talking about the post op diet protocol. Patient is aware they will be on a liquid diet before surgery and then a week of liquids after surgery followed by 5 weeks of soft protein. Patient's current diet habits include: Patient is eating 2-3 meals per day. Meals are made up of big into vegetables. We have talked about eating protein first, following by vegetables. Portion sizes are trying to use a smaller plate. Suggestions and tips were reviewed to help decrease portion sizes. Eating out frequency is 2-3 x a month. We talked about the importance of planning ahead, so eating out intake can be decreased. Carbohydrate intake (including bread, rice, pasta, cereal, crackers, chips, etc.) is: 2-3 x a month. I have talked to patient about the importance of filling up on protein first, which will help with satiety. Patient is snacking 1-2  times a day on cucumbers or string cheese. We talked about snacks that would be more protein-based. Patient's sweet intake is:  1 x a week. Fluid intake is:  32-48 ounces of water, 8 ounces of sweet tea, 0 ounces of soda, 0 ounces of fruit juices, 0 ounces of caffeine. Physical Activity/Exercise    Comments:     Currently for exercise, patient is walking and hiking a few times a week.   We talked about activities for patient to do, including walking, swimming, or chair exercises. Goals have been set. Behavior Modification       Comments:  Emphasized the importance of eating slowly, not eating and drinking meals at the same time. I have also encouraged patient to work on food journaling  We talked about ways they can track their daily intake.     Goals that patient set for next month include:  Make more exercises     Wang Vargas Luis Fernando 87 RD  12/9/2021

## 2022-01-06 LAB
25(OH)D3 SERPL-MCNC: 25 NG/ML (ref 32–100)
A-G RATIO,AGRAT: 1.6 RATIO (ref 1.1–2.6)
ABSOLUTE LYMPHOCYTE COUNT, 10803: 1.4 K/UL (ref 1–4.8)
ALBUMIN SERPL-MCNC: 4 G/DL (ref 3.5–5)
ALP SERPL-CCNC: 48 U/L (ref 25–115)
ALT SERPL-CCNC: 16 U/L (ref 5–40)
ANION GAP SERPL CALC-SCNC: 9 MMOL/L (ref 3–15)
AST SERPL W P-5'-P-CCNC: 14 U/L (ref 10–37)
BACTERIA,BACTU: PRESENT
BASOPHILS # BLD: 0 K/UL (ref 0–0.2)
BASOPHILS NFR BLD: 1 % (ref 0–2)
BILIRUB SERPL-MCNC: 0.5 MG/DL (ref 0.2–1.2)
BILIRUB UR QL: NEGATIVE
BUN SERPL-MCNC: 10 MG/DL (ref 6–22)
CALCIUM SERPL-MCNC: 9.4 MG/DL (ref 8.4–10.5)
CHLORIDE SERPL-SCNC: 106 MMOL/L (ref 98–110)
CHOLEST SERPL-MCNC: 141 MG/DL (ref 110–200)
CLARITY: ABNORMAL
CO2 SERPL-SCNC: 29 MMOL/L (ref 20–32)
COLOR UR: YELLOW
CREAT SERPL-MCNC: 0.6 MG/DL (ref 0.5–1.2)
EOSINOPHIL # BLD: 0.1 K/UL (ref 0–0.5)
EOSINOPHIL NFR BLD: 3 % (ref 0–6)
EPITHELIAL,EPSU: ABNORMAL /HPF (ref 0–2)
ERYTHROCYTE [DISTWIDTH] IN BLOOD BY AUTOMATED COUNT: 17.1 % (ref 10–15.5)
FERRITIN SERPL-MCNC: 6 NG/ML (ref 10–291)
GFRAA, 66117: >60
GFRNA, 66118: >60
GLOBULIN,GLOB: 2.5 G/DL (ref 2–4)
GLUCOSE SERPL-MCNC: 92 MG/DL (ref 70–99)
GLUCOSE UR QL: NEGATIVE MG/DL
GRANULOCYTES,GRANS: 46 % (ref 40–75)
HCT VFR BLD AUTO: 32.9 % (ref 35.1–46.5)
HDLC SERPL-MCNC: 31 MG/DL
HDLC SERPL-MCNC: 4.5 MG/DL (ref 0–5)
HGB BLD-MCNC: 9.2 G/DL (ref 11.7–15.5)
HGB UR QL STRIP: NEGATIVE
IMMATURE PLATELET FRACTION: 10.2 % (ref 1.1–7.1)
IRON,IRN: <19 MCG/DL (ref 30–160)
KETONES UR QL STRIP.AUTO: NEGATIVE MG/DL
LDL/HDL RATIO,LDHD: 2.7
LDLC SERPL CALC-MCNC: 84 MG/DL (ref 50–99)
LEUKOCYTE ESTERASE: ABNORMAL
LYMPHOCYTES, LYMLT: 41 % (ref 20–45)
MCH RBC QN AUTO: 20 PG (ref 26–34)
MCHC RBC AUTO-ENTMCNC: 28 G/DL (ref 31–36)
MCV RBC AUTO: 70 FL (ref 80–99)
MONOCYTES # BLD: 0.3 K/UL (ref 0.1–1)
MONOCYTES NFR BLD: 9 % (ref 3–12)
NEUTROPHILS # BLD AUTO: 1.6 K/UL (ref 1.8–7.7)
NITRITE UR QL STRIP.AUTO: NEGATIVE
NON-HDL CHOLESTEROL, 011976: 110 MG/DL
PH UR STRIP: 6 PH (ref 5–8)
PLATELET # BLD AUTO: 186 K/UL (ref 140–440)
PMV BLD AUTO: 11.9 FL (ref 9–13)
POTASSIUM SERPL-SCNC: 4.2 MMOL/L (ref 3.5–5.5)
PROT SERPL-MCNC: 6.5 G/DL (ref 6.4–8.3)
PROT UR QL STRIP: ABNORMAL MG/DL
RBC # BLD AUTO: 4.68 M/UL (ref 3.8–5.2)
RBC #/AREA URNS HPF: ABNORMAL /HPF
SODIUM SERPL-SCNC: 144 MMOL/L (ref 133–145)
SP GR UR: 1.03 (ref 1–1.03)
TRIGL SERPL-MCNC: 131 MG/DL (ref 40–149)
TSH SERPL DL<=0.005 MIU/L-ACNC: 1.5 MCU/ML (ref 0.27–4.2)
URINE ASCORBIC ACID: NEGATIVE MG/DL
UROBILINOGEN UR STRIP-MCNC: <2 MG/DL
VIT B12 SERPL-MCNC: 1370 PG/ML (ref 211–911)
VLDLC SERPL CALC-MCNC: 26 MG/DL (ref 8–30)
WBC # BLD AUTO: 3.4 K/UL (ref 4–11)
WBC URNS QL MICRO: ABNORMAL /HPF (ref 0–5)

## 2022-01-07 LAB — APOLIPOPROTEIN B: 73 MG/DL

## 2022-01-13 ENCOUNTER — DOCUMENTATION ONLY (OUTPATIENT)
Dept: BARIATRICS/WEIGHT MGMT | Age: 42
End: 2022-01-13

## 2022-01-13 ENCOUNTER — HOSPITAL ENCOUNTER (OUTPATIENT)
Dept: BARIATRICS/WEIGHT MGMT | Age: 42
Discharge: HOME | End: 2022-01-13

## 2022-01-13 NOTE — PROGRESS NOTES
Nutrition Evaluation    Patient's Name: Maggi Kumari   Age: 46 y.o. YOB: 1980   Sex: female    Height: 5 f 8 Weight: 263 BMI:  40.1  Starting Weight:  271        Smoking Status:  None  Alcohol Intake:  Number of Drinks at a Time: 1  Number of Times a Week: 1    Changes made during classes include:  Smaller portions  Walking more  Eating less carbohydrates        Two things that patient learned during this weight loss trial:  I am not going to starve if I eat less  I feel better when I watch what I eat. Summary:  I feel that Maggi Kumari has demonstrated appropriate diet changes and is ready to move forward with surgery. Patient has been briefed on the importance of the protein drinks, vitamins, and the transition of the diet stages. Patient understands that the long-term diet will focus on protein and vegetables. Patient understand the effects of carbohydrates after surgery and what reactive hypoglycemia is. Patient is aware that they will be attending pre-op class 2 weeks before surgery and will get more detailed information on the post-op diet guidelines. Patient will see me again at 6 weeks post-op. At this 6 week visit, RD will assess how patient is tolerating soft protein and advance to vegetables, if tolerating soft protein without difficulty. Patient will also see RD again at 9 months post-op. This visit will assess patient's compliance with current protocol, including diet, vitamins, protein shakes, and exercise. Post-op diet guidelines will be reinforced. RD is available for questions and to meet with patient outside of the 6 week and 9 month post-op visit. We spent a lot of time talking about the vitamins. Patient understands the importance of being compliant with the diet protocol and the complications and risks that can occur if they are non-compliant with the nutritional protocol. Patient has attended at least one support group.     Candidate for surgery: Yes  Re-evaluation Date:     Procedure:  Gastric Bypass     Wang Hurst Luis Fernando 87 RD  1/13/2022

## 2022-01-13 NOTE — PROGRESS NOTES
[address] Loss [facility] PERDOMO [facility] [address], [address]    Patient's Name: Smiley Romero   Age: 46 y.o. YOB: 1980   Sex: female    Date:   1/13/2022        Session: 6 of 6  Revision:     Surgeon:  Dr Sandy Muro    Height: 5 f 8   Weight:    263      Lbs. BMI: 40.0   Pounds Lost since last month: 4                 Pounds Gained since last month: 0    Starting Weight: 271     Previous Months Weight: 267  Overall Pounds Lost: 8   Overall Pounds Gained: 0    Do you smoke? None    Alcohol intake:  Number of drinks at a time:  None  Number of times a week: None    Class Guidelines    Guidelines are reviewed with patient at the start of every class. 1. Patient understands that weight loss trial classes must be consecutive. Patient understands if they miss a class, it is their responsibility to contact me to reschedule class. I will reach out to patient after their first no show. 2.  Patient understands the expectations that weight maintenance/weight loss is expected during the classes. Failure to demonstrate changes may result in one extra month of weight loss trial, followed by going back to see the surgeon. 3. Patient is also instructed to be doing their labs, blood work, psych visit, support group and any other test that the surgeon has used while they are working on their weight loss trial.    Other Pertinent Information:     Patient has attended a support group meeting. Changes Made Since Last Class: Started eating less carbohydrates    Eating Habits and Behaviors      Today we reviewed key diet principles. We talked about snack ideas that would focus more on protein. We also talked about the benefits of filling up on protein first and keeping the daily carbohydrate intake to less than 75 grams per day.   Patient was instructed to increase fluid intake to 64 ounces per day and stop all carbonation, caffeine, and sugary drinks. During class, we talked about the importance of getting on a routine of eating 3 meals a day, eating within one hour of waking up, and not going longer than 4 hours without fueling the body again. I also talked with patient about some meal ideas. Patient's current diet habits include: Patient is eating 2-3 meals per day. Patient states meals are normally made up of fish, vegetables, very little carbohydrates. We have talked about eating protein first, following by vegetables. Patient is encouraged to start eliminating carbohydrates and try to keep less than 50 grams per day. Examples were reviewed. Portion sizes are smaller portions. Suggestions and tips were reviewed to help decrease portion sizes. Eating out frequency is 1-2 x a month. . We talked about the importance of planning ahead, so eating out intake can be decreased. Discussed with patient if they need to eat out, healthier options to choose from. Carbohydrate intake (including bread, rice, pasta, cereal, crackers, chips, etc.) is: 1 x a weej. I have talked to patient about the importance of filling up on protein first, which will help with satiety. Patient is snacking 1 times a day on cucumbers or a string cheese. We talked about snacks that would be more protein-based. Patient's sweet intake is:  0. Fluid intake is:  36 ounces of water, 0 ounces of soda, 0 ounces of sweet tea, 8 ounces of fruit juices, 0 ounces of caffeine. Patient is encouraged to stick to non-caloric drinks only. Physical Activity/Exercise    Comments:     Currently for exercise, patient is walking and playing with the kids. We talked about activities for patient to do, including walking, swimming, or chair exercises. Goals have been set. Behavior Modification       Comments:   During class, I reviewed a power point with patients called, \"Assessing Your Readiness to Change. \"  During this power point, patient was asked to self-evaluate themselves. At the end, we tallied the scores to determine how ready they are to make changes for the surgery. For the New Year's, I had patient set New Year's resolutions, including a food-related goal, exercise-related goal, and behavior goal.  Patient was encouraged to track the goals on a daily basis using the check off list I provided. Goals should be SMART, specific, measurable, attainable, realistic, and time-orientated. Patient's Goals are:  1. Behavior-Related Goal: No eating after 7 pm or in front of the TV     2. Food-related goal: Cut down on more carbohydrates    3.  Exercise-related goal: 30 minutes a day walking      Lynn Kelly RD  1/13/2022

## 2022-02-02 ENCOUNTER — TELEPHONE (OUTPATIENT)
Dept: SURGERY | Age: 42
End: 2022-02-02

## 2022-02-02 NOTE — TELEPHONE ENCOUNTER
Left message for patient to call office. Patient has finished all requirements and is ready for a pre op appointment.

## 2022-02-02 NOTE — TELEPHONE ENCOUNTER
Spoke to pt she states she  is unable to take iron and she has had to have iron infusions in past has not seen them in a while saw dr Chanda Mendez in past I have scheduled her appt for tues feb 8 at 0945 pt made aware

## 2022-02-15 ENCOUNTER — HOSPITAL ENCOUNTER (OUTPATIENT)
Dept: WOMENS IMAGING | Age: 42
Discharge: HOME | End: 2022-02-15
Attending: FAMILY MEDICINE
Payer: MEDICAID

## 2022-02-15 DIAGNOSIS — Z12.31 ENCOUNTER FOR SCREENING MAMMOGRAM FOR BREAST CANCER: ICD-10-CM

## 2022-02-15 PROCEDURE — 77063 BREAST TOMOSYNTHESIS BI: CPT

## 2022-02-17 ENCOUNTER — OFFICE VISIT (OUTPATIENT)
Dept: SURGERY | Age: 42
End: 2022-02-17
Payer: MEDICAID

## 2022-02-17 VITALS
BODY MASS INDEX: 40.16 KG/M2 | HEIGHT: 68 IN | SYSTOLIC BLOOD PRESSURE: 129 MMHG | RESPIRATION RATE: 16 BRPM | DIASTOLIC BLOOD PRESSURE: 80 MMHG | WEIGHT: 265 LBS | HEART RATE: 85 BPM | OXYGEN SATURATION: 97 % | TEMPERATURE: 98.1 F

## 2022-02-17 PROCEDURE — 99214 OFFICE O/P EST MOD 30 MIN: CPT | Performed by: SURGERY

## 2022-02-17 NOTE — PROGRESS NOTES
Preop History and Physical Exam:    Rickey Turpin is a 38 y.o. white female initially evaluated in this office on 2021 for discussion of the surgical options available for definitive weight loss. The patient at that time weighed 266 pounds with a body mass index of 40 with obesity related comorbidities of hypertension, Gastrosoft reflux disease, reactive airway disease, stress urinary incontinence, clinical obstructive sleep apnea, and weight related arthropathy. Patient after discussion surgical options elected pursue laparoscopic potentially open Lisa-en-Y gastric bypass to achieve definitive durable weight loss on a personal level with expected resolution of obesity related comorbidities. The patient returns today after completing all multidisciplinary bariatric surgical programmatic requirements necessary prior to pursuit of surgery for review of diagnostic evaluation and surgical scheduling noting significant medical history of an iron transfusion approximately 1 week ago and otherwise no new medical or surgical history. Patient currently weighs 265 pounds on 5 foot inch frame with a body mass index of 40 with obesity related comorbidities of hypertension, gastroesophageal reflux disease, reactive airway disease, stress urinary incontinence, clinical obstructive sleep apnea, and weight related arthropathy. Ideal body weight 143 pounds, excess body weight 122 pounds, estimated postsurgical weight loss based on 80% loss of excess body weight 98 pounds, postsurgical goal weight 167 pounds    Past Medical History:   Diagnosis Date    Anemia     receives iron infusions    Asthma     Hypertension        Past Surgical History:   Procedure Laterality Date    HX  SECTION      HX CHOLECYSTECTOMY         Current Outpatient Medications   Medication Sig Dispense Refill    verapamil (CALAN) 120 mg tablet Take 120 mg by mouth three (3) times daily.          Allergies   Allergen Reactions  Iron Other (comments)     Burning per pt reports    Iron Other (comments)     Body feels like on fire    Zoloft [Sertraline] Other (comments)     Burning per pt reports       Social History     Tobacco Use    Smoking status: Former Smoker     Quit date: 10/11/2000     Years since quittin.3    Smokeless tobacco: Never Used   Substance Use Topics    Alcohol use: No    Drug use: No       Family History   Problem Relation Age of Onset    Stroke Mother     Obesity Father     Cancer Father         cancer  throat    Cancer Maternal Grandmother     Breast Cancer Maternal Grandmother         60's       Review of Systems:  Positive in BOLD    CONST: Fever, weight loss, fatigue or chills  GI: Nausea, vomiting, abdominal pain, change in bowel habits, hematochezia, melena, and GERD   INTEG: Dermatitis, abnormal moles  HEENT: Recent changes in vision, vertigo, epistaxis, dysphagia and hoarseness  CV: Chest pain, palpitations, HTN, edema and varicosities  RESP: Cough, shortness of breath, wheezing, hemoptysis, snoring and reactive airway disease  : Hematuria, dysuria, frequency, urgency, nocturia and stress urinary incontinence   MS: Weakness, joint pain and arthritis  ENDO: Diabetes, thyroid disease, polyuria, polydipsia, polyphagia, poor wound healing, heat intolerance, cold intolerance  LYMPH/HEME: Anemia, bruising and history of blood transfusions  NEURO: Dizziness, headache, fainting, seizures and stroke  PSYCH: Anxiety and depression    Physical Exam    Visit Vitals  /80   Pulse 85   Temp 98.1 °F (36.7 °C)   Resp 16   Ht 5' 8\" (1.727 m)   Wt 120.2 kg (265 lb)   LMP 2022   SpO2 97%   BMI 40.29 kg/m²         General: Clinically severely obese 47-year-old white female in no acute distress  Head: Normocephalic, atraumatic  Resp: Clear to auscultation bilaterally, now wheeze, rhonchi, or rales, excursions normal and symmetrical  Cardio: Regular rate and rhythm, no murmurs, clicks, gallops, or rubs. No edema or varicosities. Abdomen: Obese, soft, nontender, nondistended, normoactive bowel sounds, no hernias, no hepatosplenomegaly,  Psych: Alert and oriented to person, place, and time. October 14, 2021, January 6, 2022 CBC, CMP, cholesterol panel, TSH, urinalysis, vitamin B1, B12, folate, iron, vitamin D, H. pylori serology: Iron less than 19, hematocrit 32.9, hemoglobin 9.2, white blood cell count 3.4, vitamin D 25, B12 1370, 5-10 red blood cells per high-power field and 10-20 white blood cells per high-power field in her urine with remainder laboratory profile within normal limits. Patient was begun on supplemental vitamin D at the time received laboratory profile and has undergone iron transfusion  March 3, 2021 Pap smear: High-grade squamous intraepithelial lesion for which the patient underwent a LEEP with further follow-up as per her gynecologist  January 6, 2022 chest x-ray: No active disease  February 4, 2020 bilateral mammography: In process  January 13, 2022 bariatric nutrition/Martinez: Concurrent with procedures surgery  December 13, 2021 psychology/Canales: Concurrent with procedures surgery  February 15, 2022 EKG: Normal sinus rhythm with rate of 85, LVH by voltage    Impression:    Mariana Cantu is a 38 y.o. white female with a body mass index of 40 with obesity related comorbidities hypertension, gastroesophageal reflux disease, reactive airway disease, stress urinary incontinence, clinical obstructive sleep apnea and weight related arthropathy who would benefit from bariatric surgery. We've discussed the restrictive and malabsorptive nature of the gastric bypass and compared and contrasted with the sleeve gastrectomy. The patient understands the likelihood of losing approximately 80% of their excess weight in 12 to 18 months.  She also understands the risks including but not limited to bleeding, infection, need for reoperation, anastomotic ulcers, leaks and strictures, bowel obstruction secondary to adhesions and internal hernias, DVT, PE, heart attack, stroke, and death. Patient also understands risks of inadequate weight loss, excess weight loss, vitamin insufficiency, protein malnutrition, excess skin, and loss of hair. We have reviewed the components of a successful postoperative course including requirement for a high protein, low carbohydrate diet, 60 oz a day of zero calorie liquids, daily vitamin supplementation, daily exercise, regular follow-up, and participation in support groups. We have reviewed the preoperative liver shrinking clear liquid diet, as well as reviewed any medication changes required while on the clear liquid diet. In addition, the patient understands that all medications to be taken during the first 8 weeks postoperatively can be taken whole as long as the medication is approximately the size of a Nciole 325 mg aspirin tablet in size. The patient further understands that it is his/her responsibility to review these and verify with their primary care doctor and pharmacist that all medications are of the appropriate size. We will schedule the patient for laparoscopic gastric bypass  in the near future.

## 2022-02-17 NOTE — PROGRESS NOTES
Pt ID confirmed    Weight Loss Metrics 2/17/2022 2/17/2022 11/11/2021 11/11/2021 8/5/2021 10/11/2019 10/11/2019   Pre op / Initial Wt 265 - 266 - - 287 -   Today's Wt - 265 lb - 266 lb 270 lb 9.6 oz - 287 lb   BMI - 40.29 kg/m2 - 40.45 kg/m2 41.14 kg/m2 - 43.64 kg/m2   Ideal Body Wt 143 - 142 - - 142 -   Excess Body Wt 122 - 124 - - 145 -   Goal Wt 167 - 167 - - 171 -   Wt loss to date 0 - 0 - - 0 -   % Wt Loss 0 - 0 - - 0 -   80% EBW 97.6 - 99.2 - - 116 -       Body mass index is 40.29 kg/m². 1. Have you been to the ER, urgent care clinic since your last visit? Hospitalized since your last visit? No    2. Have you seen or consulted any other health care providers outside of the [address] [place] [address] since your last visit? Include any pap smears or colon screening.  Yes iron infusion

## 2022-02-21 ENCOUNTER — DOCUMENTATION ONLY (OUTPATIENT)
Dept: BARIATRICS/WEIGHT MGMT | Age: 42
End: 2022-02-21

## 2022-02-22 ENCOUNTER — HOSPITAL ENCOUNTER (OUTPATIENT)
Dept: BARIATRICS/WEIGHT MGMT | Age: 42
Discharge: HOME | End: 2022-02-22

## 2022-02-22 NOTE — PROGRESS NOTES
CLINICAL NUTRITION PRE-OPERATIVE EDUCATION    Patient's Name: Sarah Low   Age: 38 y.o. YOB: 1980   Sex: female    Education & Materials Provided:   - Soft Protein Diet Shopping List  -  Supplemental Resource Guide: MVI, B12, Calcium Citrate, Vitamin D, Vitamin B1,   and iron recommendations  - Protein Supplement Information  - Fluid Requirements/ No Straws  - No Caffeine or Carbonation   - No alcohol              - No Snacks or No Concentrated Sweets     - Exercising   - Support System at [employer] [facility] [employer] of Support Group meetings. Support System after surgery includes: x     - Key Diet Principles            - Addressed Current Habits/Changes to Make   - Patient has been educated on the liquid diet to begin 1 week prior to surgery. Patient understands the transition of the diet. Attendance of support group: Yes    Summary:  Patient has completed the required amount of visits with the Registered Dietitian. During these nutrition visits, we focused on dietary changes, behavior changes, and the importance of establishing an exercise routine. The diet protocol that patient was prescribed emphasized on low carbohydrates (less than 100 grams per day prior to surgery and 60-80 grams of protein per day). At today's session, patient was educated on the post-op diet protocol. Patient understands the importance of keeping total fat and sugar less than 3 grams per serving. Patient is aware of the transition of the diet stages and is aware that they will be on clear liquids for 7days, followed by soft protein for 5 weeks. Patient understands the body needs ~ 60-70 grams of protein per day. During the liquid phase, patient will need 60 grams of protein from shakes. Once eating soft protein, patient will only need 1 shake per day. Patient is aware of the importance of the vitamins and protein drinks. We spent a lot of time talking about the vitamins.   Patient understands the importance of being compliant with the diet protocol and the complications and risks that can occur if they are non-compliant with the nutritional protocol and non-compliant with the vitamins. Patient has also been educated on the pre-op liquid diet for 1 week. Patient understands that failure to comply in pre-op liquid diet could result in surgery being canceled. Patient's 6 week post-op nutrition appointment has been scheduled. At this 6 week visit, RD will assess how patient is tolerating soft protein and advance to vegetables, if tolerating soft protein without difficulty. Patient will also see RD again at 9 months post-op. This visit will assess patient's compliance with current protocol, including diet, vitamins, protein shakes, and exercise. Post-op diet guidelines will be reinforced. RD is available for questions and to meet with patient outside of the 6 week and 9 month post-op visit. Ok to proceed.      Candidate for surgery: Yes  Re-evaluation Date:     Lynn Saucedo RD  2/21/2022

## 2022-03-02 ENCOUNTER — TELEPHONE (OUTPATIENT)
Dept: SURGERY | Age: 42
End: 2022-03-02

## 2022-03-11 ENCOUNTER — HOSPITAL ENCOUNTER (OUTPATIENT)
Dept: LAB | Age: 42
Discharge: HOME | End: 2022-03-11

## 2022-03-11 DIAGNOSIS — Z01.818 PRE-OP TESTING: ICD-10-CM

## 2022-03-11 DIAGNOSIS — Z01.818 PRE-OP TESTING: Primary | ICD-10-CM

## 2022-03-11 LAB — SENTARA SPECIMEN COL,SENBCF: NORMAL

## 2022-03-11 PROCEDURE — 99001 SPECIMEN HANDLING PT-LAB: CPT

## 2022-03-12 LAB — SARS-COV-2, COV2NT: NOT DETECTED

## 2022-03-14 RX ORDER — VERAPAMIL HYDROCHLORIDE 120 MG/1
120 TABLET, FILM COATED, EXTENDED RELEASE ORAL DAILY
COMMUNITY
End: 2022-03-17

## 2022-03-14 NOTE — PERIOP NOTES
PRE-SURGICAL INSTRUCTIONS        Patient's Name:  Rebecca Rasmussen      Today's Date:  3/14/2022            Covid Testing Date and Time: Unvaccinated - Covid - Negative    Surgery Date:  3/16/2022                1. Do NOT eat or drink anything, including candy, gum, or ice chips after midnight on 03/15, unless you have specific instructions from your surgeon or anesthesia provider to do so.  2. You may brush your teeth before coming to the hospital.  3. No smoking 24 hours prior to the day of surgery. 4. No alcohol 24 hours prior to the day of surgery. 5. No recreational drugs for one week prior to the day of surgery. 6. Leave all valuables, including money/purse, at home. 7. Remove all jewelry, nail polish, acrylic nails, and makeup (including mascara); no lotions powders, deodorant, or perfume/cologne/after shave on the skin. 8. Follow instruction for Hibiclens washes and CHG wipes from surgeon's office. 9. Glasses/contact lenses and dentures may be worn to the hospital.  They will be removed prior to surgery. 10. Call your doctor if symptoms of a cold or illness develop within 24-48 hours prior to your surgery. 11.  If you are having an outpatient procedure, please make arrangements for a responsible ADULT TO [address] [place] [address] and stay with you for 24 hours after your surgery. 12. ONE VISITOR in the hospital at this time for outpatient procedures. Exceptions may be made for surgical admissions, per nursing unit guidelines      Special Instructions:      Bring any pertinent legal medical records. Take these medications the morning of surgery with a sip of water:  Verapamil if OK'd   Follow physician instructions about stopping anticoagulants. On the day of surgery, come in the main entrance of [facility]. Let the  at the desk know you are there for surgery. A staff member will come escort you to the surgical area on the second floor.     If you have any questions or concerns, please do not hesitate to call:     (Prior to the day of surgery) [facility] department:  [phone]   (Day of surgery) Pre-Op department:  [phone]    These surgical instructions were reviewed with Todd Myers during the [facility] phone call.

## 2022-03-15 ENCOUNTER — ANESTHESIA EVENT (OUTPATIENT)
Dept: SURGERY | Age: 42
DRG: 403 | End: 2022-03-15
Payer: MEDICAID

## 2022-03-15 ENCOUNTER — TELEPHONE (OUTPATIENT)
Dept: SURGERY | Age: 42
End: 2022-03-15

## 2022-03-15 NOTE — TELEPHONE ENCOUNTER
Patient called questioning if she can have the protein shakes today before surgery tomorrow. Per Corina yes the patient can have the protein shakes today. Attempted to call the patient.

## 2022-03-16 ENCOUNTER — HOSPITAL ENCOUNTER (INPATIENT)
Age: 42
LOS: 1 days | Discharge: HOME | DRG: 403 | End: 2022-03-17
Attending: SURGERY | Admitting: SURGERY
Payer: MEDICAID

## 2022-03-16 ENCOUNTER — ANESTHESIA (OUTPATIENT)
Dept: SURGERY | Age: 42
DRG: 403 | End: 2022-03-16
Payer: MEDICAID

## 2022-03-16 DIAGNOSIS — E66.01 MORBID OBESITY WITH BMI OF 40.0-44.9, ADULT (HCC): ICD-10-CM

## 2022-03-16 LAB — HCG UR QL: NEGATIVE

## 2022-03-16 PROCEDURE — 77030011808 HC STPLR ENDOSCOPIC J&J -D: Performed by: SURGERY

## 2022-03-16 PROCEDURE — 77030002996 HC SUT SLK J&J -A: Performed by: SURGERY

## 2022-03-16 PROCEDURE — 81025 URINE PREGNANCY TEST: CPT

## 2022-03-16 PROCEDURE — 77030008437 HC REINF STRP REINF SEMGD WLGO -C: Performed by: SURGERY

## 2022-03-16 PROCEDURE — 74011000250 HC RX REV CODE- 250: Performed by: NURSE ANESTHETIST, CERTIFIED REGISTERED

## 2022-03-16 PROCEDURE — 77030018836 HC SOL IRR NACL ICUM -A: Performed by: SURGERY

## 2022-03-16 PROCEDURE — 77030008683 HC TU ET CUF COVD -A: Performed by: ANESTHESIOLOGY

## 2022-03-16 PROCEDURE — 77030010031 HC SCIS ENDOSC MPLR J&J -C: Performed by: SURGERY

## 2022-03-16 PROCEDURE — 43644 LAP GASTRIC BYPASS/ROUX-EN-Y: CPT | Performed by: SURGERY

## 2022-03-16 PROCEDURE — 77030019605: Performed by: SURGERY

## 2022-03-16 PROCEDURE — 74011250636 HC RX REV CODE- 250/636: Performed by: NURSE ANESTHETIST, CERTIFIED REGISTERED

## 2022-03-16 PROCEDURE — 76010000153 HC OR TIME 1.5 TO 2 HR: Performed by: SURGERY

## 2022-03-16 PROCEDURE — 74011250636 HC RX REV CODE- 250/636: Performed by: SURGERY

## 2022-03-16 PROCEDURE — 77030008598 HC TRCR ENDOSC BLDLS J&J -B: Performed by: SURGERY

## 2022-03-16 PROCEDURE — 76060000034 HC ANESTHESIA 1.5 TO 2 HR: Performed by: SURGERY

## 2022-03-16 PROCEDURE — 77030033639 HC SHR ENDO COAG HARM 36 J&J -E: Performed by: SURGERY

## 2022-03-16 PROCEDURE — 0FB24ZZ EXCISION OF LEFT LOBE LIVER, PERCUTANEOUS ENDOSCOPIC APPROACH: ICD-10-PCS | Performed by: SURGERY

## 2022-03-16 PROCEDURE — 74011250637 HC RX REV CODE- 250/637: Performed by: SURGERY

## 2022-03-16 PROCEDURE — 77030027876 HC STPLR ENDOSC FLX PWR J&J -G1: Performed by: SURGERY

## 2022-03-16 PROCEDURE — 77030031139 HC SUT VCRL2 J&J -A: Performed by: SURGERY

## 2022-03-16 PROCEDURE — 77030036732 HC RELD STPLR VASC J&J -F: Performed by: SURGERY

## 2022-03-16 PROCEDURE — 2709999900 HC NON-CHARGEABLE SUPPLY: Performed by: SURGERY

## 2022-03-16 PROCEDURE — 77030040922 HC BLNKT HYPOTHRM STRY -A: Performed by: SURGERY

## 2022-03-16 PROCEDURE — 00797 ANES IPER UPR ABD GSTR PX MO: CPT | Performed by: ANESTHESIOLOGY

## 2022-03-16 PROCEDURE — 74011000258 HC RX REV CODE- 258: Performed by: SURGERY

## 2022-03-16 PROCEDURE — 77030008756 HC TU IRR SUC STRY -B: Performed by: SURGERY

## 2022-03-16 PROCEDURE — 77030040361 HC SLV COMPR DVT MDII -B: Performed by: SURGERY

## 2022-03-16 PROCEDURE — 76210000017 HC OR PH I REC 1.5 TO 2 HR: Performed by: SURGERY

## 2022-03-16 PROCEDURE — 77030027138 HC INCENT SPIROMETER -A

## 2022-03-16 PROCEDURE — 77030027138 HC INCENT SPIROMETER -A: Performed by: SURGERY

## 2022-03-16 PROCEDURE — 65270000029 HC RM PRIVATE

## 2022-03-16 PROCEDURE — 77030002933 HC SUT MCRYL J&J -A: Performed by: SURGERY

## 2022-03-16 PROCEDURE — 77030026438 HC STYL ET INTUB CARD -A: Performed by: ANESTHESIOLOGY

## 2022-03-16 PROCEDURE — 0D164ZA BYPASS STOMACH TO JEJUNUM, PERCUTANEOUS ENDOSCOPIC APPROACH: ICD-10-PCS | Performed by: SURGERY

## 2022-03-16 PROCEDURE — 2709999900 HC NON-CHARGEABLE SUPPLY

## 2022-03-16 PROCEDURE — C9290 INJ, BUPIVACAINE LIPOSOME: HCPCS | Performed by: SURGERY

## 2022-03-16 PROCEDURE — 74011000250 HC RX REV CODE- 250: Performed by: SURGERY

## 2022-03-16 PROCEDURE — 00797 ANES IPER UPR ABD GSTR PX MO: CPT | Performed by: NURSE ANESTHETIST, CERTIFIED REGISTERED

## 2022-03-16 PROCEDURE — 77030009968 HC RELD STPLR ENDOSC J&J -D: Performed by: SURGERY

## 2022-03-16 PROCEDURE — 77030009932 HC PRB FT CTRL J&J -B: Performed by: SURGERY

## 2022-03-16 PROCEDURE — 77030008603 HC TRCR ENDOSC EPATH J&J -C: Performed by: SURGERY

## 2022-03-16 RX ORDER — OXYCODONE HYDROCHLORIDE 5 MG/1
5 TABLET ORAL
Status: DISCONTINUED | OUTPATIENT
Start: 2022-03-16 | End: 2022-03-17 | Stop reason: HOSPADM

## 2022-03-16 RX ORDER — LIDOCAINE HYDROCHLORIDE 20 MG/ML
INJECTION, SOLUTION EPIDURAL; INFILTRATION; INTRACAUDAL; PERINEURAL AS NEEDED
Status: DISCONTINUED | OUTPATIENT
Start: 2022-03-16 | End: 2022-03-16 | Stop reason: HOSPADM

## 2022-03-16 RX ORDER — MIDAZOLAM HYDROCHLORIDE 1 MG/ML
INJECTION, SOLUTION INTRAMUSCULAR; INTRAVENOUS AS NEEDED
Status: DISCONTINUED | OUTPATIENT
Start: 2022-03-16 | End: 2022-03-16 | Stop reason: HOSPADM

## 2022-03-16 RX ORDER — DIPHENHYDRAMINE HYDROCHLORIDE 50 MG/ML
25 INJECTION, SOLUTION INTRAMUSCULAR; INTRAVENOUS
Status: DISCONTINUED | OUTPATIENT
Start: 2022-03-16 | End: 2022-03-17 | Stop reason: HOSPADM

## 2022-03-16 RX ORDER — DEXAMETHASONE SODIUM PHOSPHATE 4 MG/ML
INJECTION, SOLUTION INTRA-ARTICULAR; INTRALESIONAL; INTRAMUSCULAR; INTRAVENOUS; SOFT TISSUE AS NEEDED
Status: DISCONTINUED | OUTPATIENT
Start: 2022-03-16 | End: 2022-03-16 | Stop reason: HOSPADM

## 2022-03-16 RX ORDER — SODIUM CHLORIDE, SODIUM LACTATE, POTASSIUM CHLORIDE, CALCIUM CHLORIDE 600; 310; 30; 20 MG/100ML; MG/100ML; MG/100ML; MG/100ML
150 INJECTION, SOLUTION INTRAVENOUS CONTINUOUS
Status: DISCONTINUED | OUTPATIENT
Start: 2022-03-16 | End: 2022-03-17 | Stop reason: HOSPADM

## 2022-03-16 RX ORDER — PROPOFOL 10 MG/ML
INJECTION, EMULSION INTRAVENOUS AS NEEDED
Status: DISCONTINUED | OUTPATIENT
Start: 2022-03-16 | End: 2022-03-16 | Stop reason: HOSPADM

## 2022-03-16 RX ORDER — ACETAMINOPHEN 650 MG/1
SUPPOSITORY RECTAL AS NEEDED
Status: DISCONTINUED | OUTPATIENT
Start: 2022-03-16 | End: 2022-03-16 | Stop reason: HOSPADM

## 2022-03-16 RX ORDER — GLYCOPYRROLATE 0.2 MG/ML
INJECTION INTRAMUSCULAR; INTRAVENOUS AS NEEDED
Status: DISCONTINUED | OUTPATIENT
Start: 2022-03-16 | End: 2022-03-16 | Stop reason: HOSPADM

## 2022-03-16 RX ORDER — SUCCINYLCHOLINE CHLORIDE 20 MG/ML
INJECTION INTRAMUSCULAR; INTRAVENOUS AS NEEDED
Status: DISCONTINUED | OUTPATIENT
Start: 2022-03-16 | End: 2022-03-16 | Stop reason: HOSPADM

## 2022-03-16 RX ORDER — KETOROLAC TROMETHAMINE 15 MG/ML
INJECTION, SOLUTION INTRAMUSCULAR; INTRAVENOUS AS NEEDED
Status: DISCONTINUED | OUTPATIENT
Start: 2022-03-16 | End: 2022-03-16 | Stop reason: HOSPADM

## 2022-03-16 RX ORDER — SODIUM CHLORIDE 0.9 % (FLUSH) 0.9 %
5-40 SYRINGE (ML) INJECTION AS NEEDED
Status: DISCONTINUED | OUTPATIENT
Start: 2022-03-16 | End: 2022-03-16 | Stop reason: HOSPADM

## 2022-03-16 RX ORDER — ENOXAPARIN SODIUM 100 MG/ML
40 INJECTION SUBCUTANEOUS EVERY 24 HOURS
Status: DISCONTINUED | OUTPATIENT
Start: 2022-03-17 | End: 2022-03-17 | Stop reason: HOSPADM

## 2022-03-16 RX ORDER — HYDROMORPHONE HYDROCHLORIDE 2 MG/ML
INJECTION, SOLUTION INTRAMUSCULAR; INTRAVENOUS; SUBCUTANEOUS AS NEEDED
Status: DISCONTINUED | OUTPATIENT
Start: 2022-03-16 | End: 2022-03-16 | Stop reason: HOSPADM

## 2022-03-16 RX ORDER — ROCURONIUM BROMIDE 10 MG/ML
INJECTION, SOLUTION INTRAVENOUS AS NEEDED
Status: DISCONTINUED | OUTPATIENT
Start: 2022-03-16 | End: 2022-03-16 | Stop reason: HOSPADM

## 2022-03-16 RX ORDER — HYDROMORPHONE HYDROCHLORIDE 1 MG/ML
1 INJECTION, SOLUTION INTRAMUSCULAR; INTRAVENOUS; SUBCUTANEOUS
Status: DISCONTINUED | OUTPATIENT
Start: 2022-03-16 | End: 2022-03-17 | Stop reason: HOSPADM

## 2022-03-16 RX ORDER — SODIUM CHLORIDE 0.9 % (FLUSH) 0.9 %
5-40 SYRINGE (ML) INJECTION EVERY 8 HOURS
Status: DISCONTINUED | OUTPATIENT
Start: 2022-03-16 | End: 2022-03-16 | Stop reason: HOSPADM

## 2022-03-16 RX ORDER — SODIUM CHLORIDE, SODIUM LACTATE, POTASSIUM CHLORIDE, CALCIUM CHLORIDE 600; 310; 30; 20 MG/100ML; MG/100ML; MG/100ML; MG/100ML
150 INJECTION, SOLUTION INTRAVENOUS CONTINUOUS
Status: DISCONTINUED | OUTPATIENT
Start: 2022-03-16 | End: 2022-03-16 | Stop reason: HOSPADM

## 2022-03-16 RX ORDER — NALOXONE HYDROCHLORIDE 0.4 MG/ML
0.4 INJECTION, SOLUTION INTRAMUSCULAR; INTRAVENOUS; SUBCUTANEOUS AS NEEDED
Status: DISCONTINUED | OUTPATIENT
Start: 2022-03-16 | End: 2022-03-17 | Stop reason: HOSPADM

## 2022-03-16 RX ORDER — HYDROMORPHONE HYDROCHLORIDE 2 MG/ML
0.5 INJECTION, SOLUTION INTRAMUSCULAR; INTRAVENOUS; SUBCUTANEOUS
Status: DISCONTINUED | OUTPATIENT
Start: 2022-03-16 | End: 2022-03-16 | Stop reason: HOSPADM

## 2022-03-16 RX ORDER — FENTANYL CITRATE 50 UG/ML
25 INJECTION, SOLUTION INTRAMUSCULAR; INTRAVENOUS AS NEEDED
Status: DISCONTINUED | OUTPATIENT
Start: 2022-03-16 | End: 2022-03-16 | Stop reason: HOSPADM

## 2022-03-16 RX ORDER — SODIUM CHLORIDE, SODIUM LACTATE, POTASSIUM CHLORIDE, CALCIUM CHLORIDE 600; 310; 30; 20 MG/100ML; MG/100ML; MG/100ML; MG/100ML
75 INJECTION, SOLUTION INTRAVENOUS CONTINUOUS
Status: DISCONTINUED | OUTPATIENT
Start: 2022-03-16 | End: 2022-03-16 | Stop reason: HOSPADM

## 2022-03-16 RX ORDER — FENTANYL CITRATE 50 UG/ML
INJECTION, SOLUTION INTRAMUSCULAR; INTRAVENOUS AS NEEDED
Status: DISCONTINUED | OUTPATIENT
Start: 2022-03-16 | End: 2022-03-16 | Stop reason: HOSPADM

## 2022-03-16 RX ORDER — ONDANSETRON 2 MG/ML
4 INJECTION INTRAMUSCULAR; INTRAVENOUS
Status: DISCONTINUED | OUTPATIENT
Start: 2022-03-16 | End: 2022-03-17 | Stop reason: HOSPADM

## 2022-03-16 RX ORDER — ONDANSETRON 2 MG/ML
INJECTION INTRAMUSCULAR; INTRAVENOUS AS NEEDED
Status: DISCONTINUED | OUTPATIENT
Start: 2022-03-16 | End: 2022-03-16 | Stop reason: HOSPADM

## 2022-03-16 RX ORDER — FAMOTIDINE 20 MG/1
20 TABLET, FILM COATED ORAL ONCE
Status: DISCONTINUED | OUTPATIENT
Start: 2022-03-16 | End: 2022-03-16 | Stop reason: SDUPTHER

## 2022-03-16 RX ORDER — ENOXAPARIN SODIUM 100 MG/ML
40 INJECTION SUBCUTANEOUS ONCE
Status: COMPLETED | OUTPATIENT
Start: 2022-03-16 | End: 2022-03-16

## 2022-03-16 RX ORDER — ACETAMINOPHEN 325 MG/1
650 TABLET ORAL EVERY 6 HOURS
Status: DISCONTINUED | OUTPATIENT
Start: 2022-03-16 | End: 2022-03-17 | Stop reason: HOSPADM

## 2022-03-16 RX ORDER — LIDOCAINE HYDROCHLORIDE 10 MG/ML
0.1 INJECTION, SOLUTION EPIDURAL; INFILTRATION; INTRACAUDAL; PERINEURAL AS NEEDED
Status: DISCONTINUED | OUTPATIENT
Start: 2022-03-16 | End: 2022-03-16 | Stop reason: HOSPADM

## 2022-03-16 RX ORDER — KETOROLAC TROMETHAMINE 15 MG/ML
15 INJECTION, SOLUTION INTRAMUSCULAR; INTRAVENOUS
Status: DISCONTINUED | OUTPATIENT
Start: 2022-03-16 | End: 2022-03-17 | Stop reason: HOSPADM

## 2022-03-16 RX ORDER — NEOSTIGMINE METHYLSULFATE 1 MG/ML
INJECTION, SOLUTION INTRAVENOUS AS NEEDED
Status: DISCONTINUED | OUTPATIENT
Start: 2022-03-16 | End: 2022-03-16 | Stop reason: HOSPADM

## 2022-03-16 RX ORDER — ACETAMINOPHEN 650 MG/1
650 SUPPOSITORY RECTAL ONCE
Status: DISCONTINUED | OUTPATIENT
Start: 2022-03-16 | End: 2022-03-16 | Stop reason: HOSPADM

## 2022-03-16 RX ORDER — SODIUM CHLORIDE, SODIUM LACTATE, POTASSIUM CHLORIDE, CALCIUM CHLORIDE 600; 310; 30; 20 MG/100ML; MG/100ML; MG/100ML; MG/100ML
100 INJECTION, SOLUTION INTRAVENOUS CONTINUOUS
Status: DISCONTINUED | OUTPATIENT
Start: 2022-03-16 | End: 2022-03-16 | Stop reason: HOSPADM

## 2022-03-16 RX ORDER — HYOSCYAMINE SULFATE 0.12 MG/1
0.12 TABLET SUBLINGUAL
Status: DISCONTINUED | OUTPATIENT
Start: 2022-03-16 | End: 2022-03-17 | Stop reason: HOSPADM

## 2022-03-16 RX ADMIN — HYDROMORPHONE HYDROCHLORIDE 1 MG: 2 INJECTION, SOLUTION INTRAMUSCULAR; INTRAVENOUS; SUBCUTANEOUS at 08:33

## 2022-03-16 RX ADMIN — SODIUM CHLORIDE, SODIUM LACTATE, POTASSIUM CHLORIDE, AND CALCIUM CHLORIDE 150 ML/HR: 600; 310; 30; 20 INJECTION, SOLUTION INTRAVENOUS at 16:44

## 2022-03-16 RX ADMIN — MIDAZOLAM HYDROCHLORIDE 2 MG: 2 INJECTION, SOLUTION INTRAMUSCULAR; INTRAVENOUS at 07:51

## 2022-03-16 RX ADMIN — ROCURONIUM BROMIDE 50 MG: 50 INJECTION INTRAVENOUS at 08:09

## 2022-03-16 RX ADMIN — GLYCOPYRROLATE 0.2 MG: 0.2 INJECTION INTRAMUSCULAR; INTRAVENOUS at 09:44

## 2022-03-16 RX ADMIN — HYOSCYAMINE SULFATE 0.12 MG: 0.12 TABLET ORAL; SUBLINGUAL at 18:16

## 2022-03-16 RX ADMIN — KETOROLAC TROMETHAMINE 15 MG: 15 INJECTION, SOLUTION INTRAMUSCULAR; INTRAVENOUS at 09:35

## 2022-03-16 RX ADMIN — FAMOTIDINE 20 MG: 10 INJECTION INTRAVENOUS at 06:13

## 2022-03-16 RX ADMIN — ROCURONIUM BROMIDE 10 MG: 50 INJECTION INTRAVENOUS at 08:56

## 2022-03-16 RX ADMIN — CEFAZOLIN SODIUM 3 G: 1 INJECTION, POWDER, FOR SOLUTION INTRAMUSCULAR; INTRAVENOUS at 08:05

## 2022-03-16 RX ADMIN — DEXAMETHASONE SODIUM PHOSPHATE 8 MG: 4 INJECTION, SOLUTION INTRAMUSCULAR; INTRAVENOUS at 08:12

## 2022-03-16 RX ADMIN — LIDOCAINE HYDROCHLORIDE 100 MG: 20 INJECTION, SOLUTION EPIDURAL; INFILTRATION; INTRACAUDAL; PERINEURAL at 07:58

## 2022-03-16 RX ADMIN — ACETAMINOPHEN 650 MG: 325 TABLET ORAL at 22:30

## 2022-03-16 RX ADMIN — KETOROLAC TROMETHAMINE 15 MG: 15 INJECTION, SOLUTION INTRAMUSCULAR; INTRAVENOUS at 18:16

## 2022-03-16 RX ADMIN — HYOSCYAMINE SULFATE 0.12 MG: 0.12 TABLET ORAL; SUBLINGUAL at 12:24

## 2022-03-16 RX ADMIN — PROPOFOL 200 MG: 10 INJECTION, EMULSION INTRAVENOUS at 07:58

## 2022-03-16 RX ADMIN — KETOROLAC TROMETHAMINE 15 MG: 15 INJECTION, SOLUTION INTRAMUSCULAR; INTRAVENOUS at 12:24

## 2022-03-16 RX ADMIN — SODIUM CHLORIDE, SODIUM LACTATE, POTASSIUM CHLORIDE, AND CALCIUM CHLORIDE: 600; 310; 30; 20 INJECTION, SOLUTION INTRAVENOUS at 08:40

## 2022-03-16 RX ADMIN — SODIUM CHLORIDE, SODIUM LACTATE, POTASSIUM CHLORIDE, AND CALCIUM CHLORIDE 150 ML/HR: 600; 310; 30; 20 INJECTION, SOLUTION INTRAVENOUS at 12:23

## 2022-03-16 RX ADMIN — SUCCINYLCHOLINE CHLORIDE 160 MG: 20 INJECTION, SOLUTION INTRAMUSCULAR; INTRAVENOUS at 07:59

## 2022-03-16 RX ADMIN — ENOXAPARIN SODIUM 40 MG: 40 INJECTION SUBCUTANEOUS at 06:14

## 2022-03-16 RX ADMIN — ACETAMINOPHEN 650 MG: 325 TABLET ORAL at 16:41

## 2022-03-16 RX ADMIN — HYDROMORPHONE HYDROCHLORIDE 0.5 MG: 2 INJECTION INTRAMUSCULAR; INTRAVENOUS; SUBCUTANEOUS at 10:08

## 2022-03-16 RX ADMIN — ONDANSETRON 4 MG: 2 INJECTION INTRAMUSCULAR; INTRAVENOUS at 08:08

## 2022-03-16 RX ADMIN — GLYCOPYRROLATE 0.4 MG: 0.2 INJECTION INTRAMUSCULAR; INTRAVENOUS at 09:36

## 2022-03-16 RX ADMIN — SODIUM CHLORIDE, SODIUM LACTATE, POTASSIUM CHLORIDE, AND CALCIUM CHLORIDE: 600; 310; 30; 20 INJECTION, SOLUTION INTRAVENOUS at 07:52

## 2022-03-16 RX ADMIN — Medication 4 MG: at 09:36

## 2022-03-16 RX ADMIN — HYDROMORPHONE HYDROCHLORIDE 1 MG: 2 INJECTION, SOLUTION INTRAMUSCULAR; INTRAVENOUS; SUBCUTANEOUS at 09:54

## 2022-03-16 RX ADMIN — FENTANYL CITRATE 100 MCG: 50 INJECTION, SOLUTION INTRAMUSCULAR; INTRAVENOUS at 07:53

## 2022-03-16 NOTE — ANESTHESIA POSTPROCEDURE EVALUATION
Procedure(s):  LAPAROSCOPIC MICK-EN-Y GASTRIC BYPASS. general    Anesthesia Post Evaluation      Multimodal analgesia: multimodal analgesia used between 6 hours prior to anesthesia start to PACU discharge  Patient location during evaluation: bedside  Patient participation: complete - patient participated  Level of consciousness: awake  Pain management: adequate  Airway patency: patent  Anesthetic complications: no  Cardiovascular status: stable  Respiratory status: acceptable  Hydration status: acceptable  Post anesthesia nausea and vomiting:  controlled      INITIAL Post-op Vital signs:   Vitals Value Taken Time   /75 03/16/22 1139   Temp 36.3 °C (97.4 °F) 03/16/22 1139   Pulse 61 03/16/22 1140   Resp 18 03/16/22 1140   SpO2 92 % 03/16/22 1140   Vitals shown include unvalidated device data.

## 2022-03-16 NOTE — PROGRESS NOTES
Reason for Admission:  Morbid obesity ([facility] [E66.01]  Essential hypertension [I10]  Morbid obesity with BMI of 40.0-44.9, adult ([facility] [E66.01, Z68.41]                 RUR Score:    2%            Plan for utilizing home health:    No, not at this time. Patient is ambulatory, and self-care. Likelihood of Readmission:   LOW                         Transition of Care Plan:              Initial assessment completed with spouse/SO, patient's boyfriend, Kumar Wilde. Cognitive status of patient: oriented to time, place, person and situation. Face sheet information confirmed:  yes. The patient designates her boyfriend Jory Onofre [phone] to participate in her discharge plan and to receive any needed information. This patient lives in a trailer with her children, with 3-4 steps to enter. Patient is able to navigate steps as needed. Prior to hospitalization, patient was considered to be independent with ADLs/IADLS : yes. Patient has a current ACP document on file: no.      Healthcare Decision Maker:     Click here to complete [address] including selection of the Healthcare Decision Maker Relationship (ie \"Primary\")    The patient's boyfriend or friend will be available to transport patient home upon discharge. The patient already has none reported,  medical equipment available in the home. Patient is not currently active with home health. Patient has not stayed in a skilled nursing facility or rehab. This patient is on dialysis :no.            Currently, the discharge plan is Home with Family Assistance. The patient states that she can obtain her medications from the pharmacy, and take her medications as directed. Patient's current insurance is [employer]. Care Management Interventions  PCP Verified by CM:  Yes  Mode of Transport at Discharge: Self (Patient's boyfriend or a friend will be transporting patient home at time of discharge.)  Transition of Care Consult (CM Consult): Discharge Planning  Discharge Durable Medical Equipment: No  Physical Therapy Consult: No  Occupational Therapy Consult: No  Speech Therapy Consult: No  Support Systems: Child(erlinda) (Patient lives with her children. )  Confirm Follow Up Transport: Family  Discharge Location  Patient Expects to be Discharged to[de-identified] Home with family assistance        Valarie Lawrence RN  Case Management [phone]

## 2022-03-16 NOTE — PERIOP NOTES
Assumed care of pt from OR via bed. Attached to monitor. VSS. OR, MAR and anesthesia report appreciated. Will cont to monitor. 1140  TRANSFER - OUT REPORT:    Verbal report given to HOSEA Espana (name) on Cady Gao  being transferred to [facility] (unit) for routine post - op       Report consisted of patients Situation, Background, Assessment and   Recommendations(SBAR). Information from the following report(s) SBAR, OR Summary, MAR and Cardiac Rhythm sinus rhythm was reviewed with the receiving nurse. Lines:   Peripheral IV 03/16/22 Posterior;Right Hand (Active)   Site Assessment Clean, dry, & intact 03/16/22 1153   Phlebitis Assessment 0 03/16/22 0632   Infiltration Assessment 0 03/16/22 0632   Dressing Status Clean, dry, & intact 03/16/22 1153   Dressing Type Transparent 03/16/22 1153   Hub Color/Line Status Blue; Infusing;Patent 03/16/22 0632   Alcohol Cap Used No 03/16/22 0800        Opportunity for questions and clarification was provided.       Patient transported with:   O2 @ 2 liters  Tech

## 2022-03-16 NOTE — ANESTHESIA PREPROCEDURE EVALUATION
Relevant Problems   CARDIOVASCULAR   (+) Essential hypertension      ENDOCRINE   (+) Morbid obesity (HCC)       Anesthetic History   No history of anesthetic complications            Review of Systems / Medical History  Patient summary reviewed and pertinent labs reviewed    Pulmonary            Asthma (Last use of nhaler ~ 3 years ago) : well controlled       Neuro/Psych   Within defined limits           Cardiovascular    Hypertension: well controlled              Exercise tolerance: >4 METS     GI/Hepatic/Renal     GERD: well controlled           Endo/Other        Morbid obesity     Other Findings              Physical Exam    Airway  Mallampati: II  TM Distance: 4 - 6 cm  Neck ROM: normal range of motion   Mouth opening: Normal     Cardiovascular  Regular rate and rhythm,  S1 and S2 normal,  no murmur, click, rub, or gallop             Dental    Dentition: Upper partial plate     Pulmonary  Breath sounds clear to auscultation               Abdominal  GI exam deferred       Other Findings            Anesthetic Plan    ASA: 2            Induction: Intravenous  Anesthetic plan and risks discussed with: Patient

## 2022-03-16 NOTE — PROGRESS NOTES
conducted a pre-surgery visit with Smiley Romero, who is a 38 y.o.,female. The  provided the following Interventions:  Initiated a relationship of care and support. Offered prayer and assurance of continued prayers on patient's behalf. There is no advance directive on file here. Plan:  Chaplains will continue to follow and will provide pastoral care on an as needed/requested basis.  recommends bedside caregivers page  on duty if patient shows signs of acute spiritual or emotional distress.     Reiseñor 3   Board Certified [address] [place] [address]   [phone]

## 2022-03-16 NOTE — PROGRESS NOTES
Bedside and Verbal shift change report given to  Jaelyn Damon (oncoming nurse) by sister Stanislaw Robles RN (offgoing nurse). Report included the following information SBAR, Kardex, Intake/Output and MAR.

## 2022-03-16 NOTE — PROGRESS NOTES
CM called patient's room to do initial assessment, no answer. CM called patient's cell phone, went to voicemail, CM left a message for a return phone call, phone number given. CM called patient's boyfriend Jasmin Yusufred [phone], states phone number is not in service.              Loida Reyna RN  Case Management [phone]

## 2022-03-16 NOTE — H&P
Office Visit    2/17/2022  [employer] [facility]   Darrell Wong DO    General Surgery  BMI 40.0-44.9, adult [facility])    Dx  Pre-op Exam ; Referred by Kimber Gomez MD    Reason for Visit       Progress Notes  Darrell Wong DO (Physician) [facility] General Surgery     Preop History and Physical Exam:     Mariana Cantu is a 38 y.o. white female initially evaluated in this office on November 11, 2021 for discussion of the surgical options available for definitive weight loss. The patient at that time weighed 266 pounds with a body mass index of 40 with obesity related comorbidities of hypertension, Gastrosoft reflux disease, reactive airway disease, stress urinary incontinence, clinical obstructive sleep apnea, and weight related arthropathy. Patient after discussion surgical options elected pursue laparoscopic potentially open Lisa-en-Y gastric bypass to achieve definitive durable weight loss on a personal level with expected resolution of obesity related comorbidities. The patient returns today after completing all multidisciplinary bariatric surgical programmatic requirements necessary prior to pursuit of surgery for review of diagnostic evaluation and surgical scheduling noting significant medical history of an iron transfusion approximately 1 week ago and otherwise no new medical or surgical history. Patient currently weighs 265 pounds on 5 foot inch frame with a body mass index of 40 with obesity related comorbidities of hypertension, gastroesophageal reflux disease, reactive airway disease, stress urinary incontinence, clinical obstructive sleep apnea, and weight related arthropathy.   Ideal body weight 143 pounds, excess body weight 122 pounds, estimated postsurgical weight loss based on 80% loss of excess body weight 98 pounds, postsurgical goal weight 167 pounds          Past Medical History:   Diagnosis Date    Anemia       receives iron infusions    Asthma      Hypertension                 Past Surgical History:   Procedure Laterality Date    HX  SECTION        HX CHOLECYSTECTOMY                    Current Outpatient Medications   Medication Sig Dispense Refill    verapamil (CALAN) 120 mg tablet Take 120 mg by mouth three (3) times daily.                   Allergies   Allergen Reactions    Iron Other (comments)       Burning per pt reports    Iron Other (comments)       Body feels like on fire    Zoloft [Sertraline] Other (comments)       Burning per pt reports         Social History            Tobacco Use    Smoking status: Former Smoker       Quit date: 10/11/2000       Years since quittin.3    Smokeless tobacco: Never Used   Substance Use Topics    Alcohol use: No    Drug use:  No         Family History   Problem Relation Age of Onset    Stroke Mother      Obesity Father      Cancer Father           cancer  throat    Cancer Maternal Grandmother      Breast Cancer Maternal Grandmother           63's         Review of Systems:  Positive in BOLD     CONST: Fever, weight loss, fatigue or chills  GI: Nausea, vomiting, abdominal pain, change in bowel habits, hematochezia, melena, and GERD   INTEG: Dermatitis, abnormal moles  HEENT: Recent changes in vision, vertigo, epistaxis, dysphagia and hoarseness  CV: Chest pain, palpitations, HTN, edema and varicosities  RESP: Cough, shortness of breath, wheezing, hemoptysis, snoring and reactive airway disease  : Hematuria, dysuria, frequency, urgency, nocturia and stress urinary incontinence   MS: Weakness, joint pain and arthritis  ENDO: Diabetes, thyroid disease, polyuria, polydipsia, polyphagia, poor wound healing, heat intolerance, cold intolerance  LYMPH/HEME: Anemia, bruising and history of blood transfusions  NEURO: Dizziness, headache, fainting, seizures and stroke  PSYCH: Anxiety and depression     Physical Exam     Visit Vitals  /80   Pulse 85   Temp 98.1 °F (36.7 °C)   Resp 16 Ht 5' 8\" (1.727 m)   Wt 120.2 kg (265 lb)   LMP 02/01/2022   SpO2 97%   BMI 40.29 kg/m²            General: Clinically severely obese 33-year-old white female in no acute distress  Head: Normocephalic, atraumatic  Resp: Clear to auscultation bilaterally, now wheeze, rhonchi, or rales, excursions normal and symmetrical  Cardio: Regular rate and rhythm, no murmurs, clicks, gallops, or rubs. No edema or varicosities. Abdomen: Obese, soft, nontender, nondistended, normoactive bowel sounds, no hernias, no hepatosplenomegaly,  Psych: Alert and oriented to person, place, and time.     October 14, 2021, January 6, 2022 CBC, CMP, cholesterol panel, TSH, urinalysis, vitamin B1, B12, folate, iron, vitamin D, H. pylori serology: Iron less than 19, hematocrit 32.9, hemoglobin 9.2, white blood cell count 3.4, vitamin D 25, B12 1370, 5-10 red blood cells per high-power field and 10-20 white blood cells per high-power field in her urine with remainder laboratory profile within normal limits. Patient was begun on supplemental vitamin D at the time received laboratory profile and has undergone iron transfusion  March 3, 2021 Pap smear: High-grade squamous intraepithelial lesion for which the patient underwent a LEEP with further follow-up as per her gynecologist  January 6, 2022 chest x-ray: No active disease  February 4, 2020 bilateral mammography: In process  January 13, 2022 bariatric nutrition/Martinez: Concurrent with procedures surgery  December 13, 2021 psychology/Canales: Concurrent with procedures surgery  February 15, 2022 EKG: Normal sinus rhythm with rate of 85, LVH by voltage     Impression:     Ernestina Moreno is a 38 y.o. white female with a body mass index of 40 with obesity related comorbidities hypertension, gastroesophageal reflux disease, reactive airway disease, stress urinary incontinence, clinical obstructive sleep apnea and weight related arthropathy who would benefit from bariatric surgery.   We've discussed the restrictive and malabsorptive nature of the gastric bypass and compared and contrasted with the sleeve gastrectomy. The patient understands the likelihood of losing approximately 80% of their excess weight in 12 to 18 months. She also understands the risks including but not limited to bleeding, infection, need for reoperation, anastomotic ulcers, leaks and strictures, bowel obstruction secondary to adhesions and internal hernias, DVT, PE, heart attack, stroke, and death. Patient also understands risks of inadequate weight loss, excess weight loss, vitamin insufficiency, protein malnutrition, excess skin, and loss of hair. We have reviewed the components of a successful postoperative course including requirement for a high protein, low carbohydrate diet, 60 oz a day of zero calorie liquids, daily vitamin supplementation, daily exercise, regular follow-up, and participation in support groups. We have reviewed the preoperative liver shrinking clear liquid diet, as well as reviewed any medication changes required while on the clear liquid diet. In addition, the patient understands that all medications to be taken during the first 8 weeks postoperatively can be taken whole as long as the medication is approximately the size of a Nicole 325 mg aspirin tablet in size. The patient further understands that it is his/her responsibility to review these and verify with their primary care doctor and pharmacist that all medications are of the appropriate size. We will schedule the patient for laparoscopic gastric bypass  in the near future. The above history and physical examination was reviewed. There is been no interval medical or surgical history. The proposed laparoscopic potentially open Lisa-en-Y gastric bypass to achieve definitive durable weight loss on a personal level was discussed.   The risk benefits of operating versus opted to alternatives and procedure complications up to and including death were again discussed. The patient noted her understanding discussion wished to proceed.     Wilfredo Phillips, , FACS

## 2022-03-16 NOTE — ROUTINE PROCESS
Pt assisted to the chair with SCDs on bilat. Pt stable. 1530-Pt assisting to the chair. No acute distress noted.

## 2022-03-16 NOTE — BRIEF OP NOTE
Brief Postoperative Note    Patient: Bucky Ledbetter  YOB: 1980  MRN: [id number]    Date of Procedure: 3/16/2022     Pre-Op Diagnosis: Morbid obesity ([address] [E66.01]  Essential hypertension [I10]    Post-Op Diagnosis: Same as preoperative diagnosis.       Procedure(s):  LAPAROSCOPIC MICK-EN-Y GASTRIC BYPASS    Surgeon(s):  Brittany Almonte DO    Surgical Assistant: Surg Asst-1: Svitlana Snider    Anesthesia: General     Estimated Blood Loss (mL): Minimal    Complications: None    Specimens: * No specimens in log *     Implants: * No implants in log *    Drains:   [REMOVED] Orogastric Tube 03/16/22 (Removed)       Findings: Normal anatomy    Electronically Signed by Ronny Mckinney DO on 3/16/2022 at 9:45 AM

## 2022-03-17 VITALS
OXYGEN SATURATION: 100 % | RESPIRATION RATE: 16 BRPM | BODY MASS INDEX: 40.75 KG/M2 | HEART RATE: 82 BPM | DIASTOLIC BLOOD PRESSURE: 73 MMHG | SYSTOLIC BLOOD PRESSURE: 118 MMHG | HEIGHT: 68 IN | TEMPERATURE: 97.5 F | WEIGHT: 268.9 LBS

## 2022-03-17 LAB
ANION GAP SERPL CALC-SCNC: 6 MMOL/L (ref 3–18)
BUN SERPL-MCNC: 10 MG/DL (ref 7–18)
BUN/CREAT SERPL: 17 (ref 12–20)
CALCIUM SERPL-MCNC: 8.7 MG/DL (ref 8.5–10.1)
CHLORIDE SERPL-SCNC: 110 MMOL/L (ref 100–111)
CO2 SERPL-SCNC: 24 MMOL/L (ref 21–32)
CREAT SERPL-MCNC: 0.59 MG/DL (ref 0.6–1.3)
ERYTHROCYTE [DISTWIDTH] IN BLOOD BY AUTOMATED COUNT: 22.7 % (ref 11.6–14.5)
GLUCOSE SERPL-MCNC: 83 MG/DL (ref 74–99)
HCT VFR BLD AUTO: 35.6 % (ref 35–45)
HGB BLD-MCNC: 11.3 G/DL (ref 12–16)
MAGNESIUM SERPL-MCNC: 1.8 MG/DL (ref 1.6–2.6)
MCH RBC QN AUTO: 25.1 PG (ref 24–34)
MCHC RBC AUTO-ENTMCNC: 31.7 G/DL (ref 31–37)
MCV RBC AUTO: 79.1 FL (ref 78–100)
NRBC # BLD: 0 K/UL (ref 0–0.01)
NRBC BLD-RTO: 0 PER 100 WBC
PLATELET # BLD AUTO: 135 K/UL (ref 135–420)
PMV BLD AUTO: 11.7 FL (ref 9.2–11.8)
POTASSIUM SERPL-SCNC: 4.2 MMOL/L (ref 3.5–5.5)
RBC # BLD AUTO: 4.5 M/UL (ref 4.2–5.3)
SODIUM SERPL-SCNC: 140 MMOL/L (ref 136–145)
WBC # BLD AUTO: 8.2 K/UL (ref 4.6–13.2)

## 2022-03-17 PROCEDURE — 83735 ASSAY OF MAGNESIUM: CPT

## 2022-03-17 PROCEDURE — 85027 COMPLETE CBC AUTOMATED: CPT

## 2022-03-17 PROCEDURE — 36415 COLL VENOUS BLD VENIPUNCTURE: CPT

## 2022-03-17 PROCEDURE — 80048 BASIC METABOLIC PNL TOTAL CA: CPT

## 2022-03-17 PROCEDURE — 74011250637 HC RX REV CODE- 250/637: Performed by: SURGERY

## 2022-03-17 PROCEDURE — 74011000250 HC RX REV CODE- 250: Performed by: SURGERY

## 2022-03-17 PROCEDURE — 99024 POSTOP FOLLOW-UP VISIT: CPT | Performed by: NURSE PRACTITIONER

## 2022-03-17 PROCEDURE — C9113 INJ PANTOPRAZOLE SODIUM, VIA: HCPCS | Performed by: SURGERY

## 2022-03-17 PROCEDURE — 74011250636 HC RX REV CODE- 250/636: Performed by: SURGERY

## 2022-03-17 RX ORDER — HYOSCYAMINE SULFATE 0.12 MG/1
0.12 TABLET SUBLINGUAL
Qty: 12 TABLET | Refills: 0 | Status: SHIPPED | OUTPATIENT
Start: 2022-03-17 | End: 2022-06-16

## 2022-03-17 RX ORDER — ONDANSETRON 4 MG/1
4 TABLET, ORALLY DISINTEGRATING ORAL
Qty: 12 TABLET | Refills: 0 | Status: SHIPPED | OUTPATIENT
Start: 2022-03-17

## 2022-03-17 RX ADMIN — ENOXAPARIN SODIUM 40 MG: 100 INJECTION SUBCUTANEOUS at 09:00

## 2022-03-17 RX ADMIN — SODIUM CHLORIDE, SODIUM LACTATE, POTASSIUM CHLORIDE, AND CALCIUM CHLORIDE 150 ML/HR: 600; 310; 30; 20 INJECTION, SOLUTION INTRAVENOUS at 00:36

## 2022-03-17 RX ADMIN — PANTOPRAZOLE SODIUM 40 MG: 40 INJECTION, POWDER, FOR SOLUTION INTRAVENOUS at 09:02

## 2022-03-17 RX ADMIN — SODIUM CHLORIDE, SODIUM LACTATE, POTASSIUM CHLORIDE, AND CALCIUM CHLORIDE 150 ML/HR: 600; 310; 30; 20 INJECTION, SOLUTION INTRAVENOUS at 07:45

## 2022-03-17 RX ADMIN — ACETAMINOPHEN 650 MG: 325 TABLET ORAL at 11:48

## 2022-03-17 RX ADMIN — ACETAMINOPHEN 650 MG: 325 TABLET ORAL at 05:58

## 2022-03-17 NOTE — PROGRESS NOTES
D/C order noted for today. Orders reviewed. No needs identified at this time. Patients boyfriend will transport home. CM remains available if needed.       BOB Ibarra, RN  Care Management  Pager # [phone]

## 2022-03-17 NOTE — OP NOTES
[address] [place] [address]   OPERATIVE REPORT    Name:  Be Martínez  MR#:   [id number]  :  1980  ACCOUNT #:  [de-identified]  DATE OF SERVICE:  2022    PREOPERATIVE DIAGNOSES:  Clinically severe obesity with a body mass index of 41 with obesity-related comorbidities consisting of hypertension, gastroesophageal reflux disease, reactive airway disease, stress urinary incontinence, chronic obstructive sleep apnea, and weight-related arthropathy. POSTOPERATIVE DIAGNOSES:  Clinically severe obesity with a body mass index of 41 with obesity-related comorbidities consisting of hypertension, gastroesophageal reflux disease, reactive airway disease, stress urinary incontinence, chronic obstructive sleep apnea, and weight-related arthropathy. PROCEDURE PERFORMED:  Laparoscopic Lisa-en-Y gastric bypass utilizing a 15 mL separate tubularized gastric pouch based on the lesser curvature of the stomach, a 298 cm retrocolic retrogastric Lisa limb, and a 40 cm biliopancreatic limb. SURGEON:  Jumana Morales DO.    FIRST ASSISTANT:  Svitlana Snider SA.    ANESTHESIA:  General endotracheal supplemented at the conclusion of the operative procedure with local infiltration of the operative sites utilizing 266 mg of Exparel diluted in 50 mL of normal saline solution. COMPLICATIONS:  None. SPECIMENS REMOVED:  None. IMPLANTS: None. ESTIMATED BLOOD LOSS:  Less than 25 mL. OPERATIVE FINDINGS:  Normal intra-abdominal anatomy. INDICATIONS FOR SURGICAL PROCEDURE:  This is a 42-year-old white female who has failed medical management of her clinically severe obesity and who after investigating the surgical options has elected to pursue laparoscopic potentially open Lisa-en-Y gastric bypass to achieve definitive durable weight loss on a personal level with expected resolution of obesity-related comorbidities.   The risks and benefits of operative versus nonoperative potential alternatives and potential complications up to and including death were extensively discussed with the patient prior to the surgical procedure, who voiced her understanding and wished to proceed. DESCRIPTION OF PROCEDURE:  The patient received Ancef for antibiotic prophylaxis and Lovenox for DVT prophylaxis in the preoperative staging area. After voiding and then signing her operative permit, which was properly witnessed, she was transported to the operating room where she was placed in the supine position on the operating table and SCDs were placed and inflated prior to the induction of general endotracheal anesthesia. A 34-Kyrgyz orogastric tube was placed atraumatically to gravity drainage for utilization for gastric decompression as well as a sizing template for construction of the gastric pouch. The abdomen was then prepped and draped in the usual sterile fashion. A time-out procedure was then performed according to protocol and agreed upon by all personnel in the operating suite. A transverse 12 mm cutaneous incision was made just superior to the umbilicus in the midline through which a 12 mm Optiview trocar and cannula were placed into the peritoneal cavity under direct vision utilizing a 10 mm 0-degree laparoscope. The laparoscope and trocar were removed. The abdomen was then insufflated with carbon dioxide gas never exceeding a pressure of 15 mmHg and a 30-degree 10 mm laparoscope was then placed and utilized for the remainder of the procedure. The remaining ports were placed under direct vision utilizing transverse cutaneous incisions and Optiview trocars and cannulas, the second was a 5 mm incision in the left anterior axillary line two fingerbreadths below the left costal margin, the third a 12 mm incision midway between the left upper quadrant and the supraumbilical incision, and the fourth in the right paramedian location 12 mm in length 8 cm from the midline, midway between the costal margin and the level of the umbilicus.   After brief exploration of the upper abdomen utilizing the laparoscope after the appropriately sized Optiview trocars and cannulas were then placed, the omentum and transverse colon were reflected superiorly. The ligament of Treitz was identified; 40 cm distal to the ligament of Treitz, the jejunum was transected with a triple load stapling device, 60 mm in length, loaded with 2.5 mm staples. The mesentery was then divided down to its base utilizing the Harmonic scalpel. The Lisa limb was measured to be 100 cm in length, and at this point, on its antimesenteric border, an enterotomy was created with the Harmonic scalpel. A similar antimesenteric enterotomy was created 2 cm proximal to the staple line of the biliopancreatic limb, and a triple-load stapling device, loaded with 2.5 mm staples was then introduced one arm into each of the respective limbs prior to firing it on their antimesenteric borders. The remaining enteric defect was then closed with a running full-thickness 3-0 Vicryl suture, the mesenteric defect was closed with a running 2-0 silk suture. The ligament of Treitz was re-identified. Just anterior to ligament of Treitz, a fenestration was created through the mesocolon into the lesser sac utilizing the Harmonic scalpel, and the Lisa limb was placed through this mesocolonic fenestration into the lesser sac. The omentum and transverse colon were reflected back to their normal anatomic positions. The donte was identified along the lesser curvature of the stomach. Just inferior to the donte along the greater curvature of the stomach, the omentum was  from the gastric wall utilizing the Harmonic scalpel until the Lisa limb was visualized within the lesser sac. A transverse 5 mm cutaneous incision was then made one-third the distance from the xiphisternal junction to the umbilicus in the midline through which a 5 mm trocar without a cannula was placed under direct vision.   This was removed and replaced with a 5 mm atraumatic grasping forceps which was utilized in conjunction with a self-retaining retractor to elevate the left lobe of the liver and provide hiatal exposure. The peritoneum overlying the angle of His was then opened with the  Harmonic scalpel; 5 cm distal to the GE junction along the lesser curvature of the stomach, the neurovascular elements of the lesser omentum were  from the gastric wall utilizing the Harmonic scalpel. Completion of this lesser curve fenestration into the lesser sac was accomplished utilizing an esophageal retractor introduced through the omental fenestration along the greater curvature of the stomach posterior to the stomach. The 34-Congolese orogastric tube was retracted into the esophagus. A triple-load stapling device, 60 mm in length, loaded with 3.5 mm staples was then introduced into the lesser curve fenestration. It was oriented perpendicular to the lesser curve 5 cm distal to the GE junction prior to firing two-thirds the length of the staple load. The 34-Congolese orogastric tube was then advanced and utilized as a sizing template for construction of the tubularized gastric pouch based on the lesser curvature of the stomach. The vertical aspect of the pouch was initiated with a triple-load stapling device, 60 mm in length, loaded with 3.5 mm staples, utilizing two-thirds the length of the staple load. The remainder of the pouch was constructed with sequential firings of a triple-load stapling device, 60 mm in length, loaded with 3.5 mm staples, ending the transection at the angle of His. The initial full-length 3.5 mm staple load utilized an Ethicon staple line reinforcement absorbable implant and this then created a 15 mL separate tubularized gastric pouch based on the lesser curvature of the stomach.   The Lisa limb was elevated posterior to the stomach in a retrogastric position where a tension-free hand-sewn two-layered gastrojejunostomy was constructed. The geometric orientation of the gastrojejunostomy was at the distal aspect of the tubularized gastric pouch to the antimesenteric border of the Lisa limb 2 cm distal to the staple line and a posterior row of running seromuscular 3-0 Vicryl suture was placed. A transverse 12 mm gastrotomy was made at the distal aspect of the tubularized gastric pouch with an adjacent antimesenteric 12 mm Lisa limb enterotomy. The running inner layer of full-thickness 3-0 Vicryl was then initiated in the left lateral posterior aspect of the anastomosis running across the posterior aspect of the anastomosis, running around the right lateral aspect of the anastomosis to the anterior midline. A second full-thickness 3-0 Vicryl suture was began adjacent to the origin of the first and running around the left lateral aspect of the anastomosis to the anterior midline. The 34-Dominican orogastric tube was then advanced across the gastrojejunal anastomosis into the Lisa limb prior to tying the running inner layer of full-thickness 3-0 Vicryl sutures together anteriorly and the gastrojejunal anastomosis was then completed anteriorly utilizing a running seromuscular 3-0 Vicryl suture. The 34-Dominican orogastric tube was removed, and after assuring there was adequate redundancy of the Lisa limb above the level of the mesocolon, the omentum and transverse colon were reflected superiorly. The space through which a Lynch's hernia might occur was closed with a running 2-0 silk suture. The mesocolonic defect was closed with a series of simple interrupted 2-0 silk sutures. The omentum and transverse colon were reflected back to their normal anatomic positions. The Lisa limb was noted to be viable with adequate redundancy above the level of the mesocolon. There was no tension noted at the gastrojejunal anastomosis. The self-retaining retractor and 5 mm atraumatic grasping forceps were removed.   The laparoscope was shifted to the left midabdominal port to allow visualization. Supraumbilical trocar defect was closed with a suture passing device and #2 Vicryl suture. All operative sites were inspected and noted to be hemostatic. The abdomen was then desufflated off carbon dioxide gas. The trocars were removed under direct vision. The fascial suture was tied. The  wounds were irrigated with normal saline solution and closure of the skin was accomplished with a series of simple interrupted buried deep dermal 4-0 Monocryl sutures. A 266 mg of Exparel diluted in 50 mL of normal saline was then injected into the incisional sites. Steri-Strips were applied as were sterile dressing. Sponge and needle count was correct both during and completion of the procedure. The patient tolerated the procedure without operative complications, subsequently was extubated, and transported to the recovery room in awake, alert, and stable condition. Estimated blood loss was less than 25 mL. The patient received 1500 mL of crystalloid during the procedure. The operative start time 0813, completion time was 0936.         DO TOSHIA Arnett/[email or web address]/  D:  03/16/2022 9:58  T:  03/17/2022 5:19  JOB #:  [id number]  CC:

## 2022-03-17 NOTE — DISCHARGE SUMMARY
Bariatric Surgery Discharge Progress Note    Admission Date: 3/16/2022    Discharge Date: 3/17/2022    PREOPERATIVE DIAGNOSES:  Clinically severe obesity with a body mass index of 41 with obesity-related comorbidities consisting of hypertension, gastroesophageal reflux disease, reactive airway disease, stress urinary incontinence, chronic obstructive sleep apnea, and weight-related arthropathy.     POSTOPERATIVE DIAGNOSES:  Clinically severe obesity with a body mass index of 41 with obesity-related comorbidities consisting of hypertension, gastroesophageal reflux disease, reactive airway disease, stress urinary incontinence, chronic obstructive sleep apnea, and weight-related arthropathy.     PROCEDURE PERFORMED:  Laparoscopic Lisa-en-Y gastric bypass utilizing a 15 mL separate tubularized gastric pouch based on the lesser curvature of the stomach, a 371 cm retrocolic retrogastric Lisa limb, and a 40 cm biliopancreatic limb. Postop Complications: none    Hospital Course:  Patient was admitted on 3/16/2022 for scheduled bariatric surgery. Operation was without significant complication. Patient admitted to the floor postoperatively, monitored as per protocol. Diet sequentially advanced beginning POD 1, pain medications transitioned to oral during the hospital course. Currently the patient is afebrile, vital signs stable, tolerating a clear liquid diet with protein supplementation, voiding spontaneously, ambulatory with adequate pain control with oral medications and clear surgical sites without evidence of infection. Discharge Diet:  Clear Liquid Bariatric Diet for 7 days, then soft moist protein diet for 5 weeks    Discharge Medications:  Current Discharge Medication List      START taking these medications    Details   hyoscyamine SL (LEVSIN/SL) 0.125 mg SL tablet 1 Tablet by SubLINGual route every six (6) hours as needed for PRN Reason (Other) (Spasms).   Qty: 12 Tablet, Refills: 0  Start date: 3/17/2022 ondansetron (ZOFRAN ODT) 4 mg disintegrating tablet Take 1 Tablet by mouth every eight (8) hours as needed for Nausea or Vomiting. Qty: 12 Tablet, Refills: 0  Start date: 3/17/2022         CONTINUE these medications which have NOT CHANGED    Details   enoxaparin (LOVENOX) 40 mg/0.4 mL 0.4 mL by SubCUTAneous route daily.   Qty: 7 Each, Refills: 0  Start date: 3/16/2022    Comments: surg 3/16         STOP taking these medications       verapamil ER (CALAN-SR) 120 mg tablet Comments:   Reason for Stopping:                 Bariatric Chewable vitamins, 2 orally daily for life  Calcium Citrate 1500 mg orally daily for life  Vitamin B12 1000 micrograms sublingual daily for life  Vitamin D3 5,000 units orally daily for life   Vitamin B1 100 mg orally daily for life    Discharge disposition: home    Discharge condition: stable      Local wound care with daily showers, keep wounds clean and dry     Activity: as desired, no lifting, pushing, or pulling greater than 15lbs or situps for 30 days     Special Instructions:            - No driving until activity is not influenced by incisional pain and off narcotics            - No bath or hot tub until wounds are healed            - Check pulse and temperature twice daily for 10 days            - Notify [employer] for a Temp >100.5 or Pulse>115     Follow-up with your surgeon in 2 weeks, call office for appointment [phone] ([facility]) [address] [facility])

## 2022-03-17 NOTE — PROGRESS NOTES
conducted a Follow up consultation and Spiritual Assessment for Alf Spencer, who is a 38 y.o.,female. The  provided the following Interventions:  Continued the relationship of care and support. Chart reviewed. Assessment:  There are no further spiritual or Restoration issues which require Spiritual Care Services interventions at this time. Plan:  Chaplains will continue to follow and will provide pastoral care on an as needed/requested basis.  recommends bedside caregivers page  on duty if patient shows signs of acute spiritual or emotional distress.      [id number] Certified [address] [place] [address]   [phone]

## 2022-03-17 NOTE — PROGRESS NOTES
Surgery Progress Note    3/17/2022    Admit Date: 3/16/2022    Subjective:     Patient has complaints of pain, but does not want to take narcotics due to how they make her feel. Patient has been ambulating in halls. She reports nausea and no vomiting. Bowel Movements: None    Objective:     Blood pressure 124/82, pulse 75, temperature 97.7 °F (36.5 °C), resp. rate 16, height 5' 8\" (1.727 m), weight 122 kg (268 lb 14.4 oz), SpO2 99 %. No intake/output data recorded. 03/15 1901 - 03/17 0700  In: 2690 [P.O.:390; I.V.:2300]  Out: 1550 [Urine:1500]    EXAM: GENERAL: alert, pleasant, no distress   HEART: regular rate and rhythm   LUNGS: clear to auscultation   ABDOMEN:  Soft, obese, appropriate incisional tenderness, +BS, non-distended, surgical incisions clean, dry, no erythema or drainage   EXTREMITIES: warm, well perfused    Data Review    Recent Results (from the past 24 hour(s))   CBC W/O DIFF    Collection Time: 03/17/22  4:52 AM   Result Value Ref Range    WBC 8.2 4.6 - 13.2 K/uL    RBC 4.50 4. 20 - 5.30 M/uL    HGB 11.3 (L) 12.0 - 16.0 g/dL    HCT 35.6 35.0 - 45.0 %    MCV 79.1 78.0 - 100.0 FL    MCH 25.1 24.0 - 34.0 PG    MCHC 31.7 31.0 - 37.0 g/dL    RDW 22.7 (H) 11.6 - 14.5 %    PLATELET 479 483 - 427 K/uL    MPV 11.7 9.2 - 11.8 FL    NRBC 0.0 0  WBC    ABSOLUTE NRBC 0.00 0.00 - 5.82 K/uL   METABOLIC PANEL, BASIC    Collection Time: 03/17/22  4:52 AM   Result Value Ref Range    Sodium 140 136 - 145 mmol/L    Potassium 4.2 3.5 - 5.5 mmol/L    Chloride 110 100 - 111 mmol/L    CO2 24 21 - 32 mmol/L    Anion gap 6 3.0 - 18 mmol/L    Glucose 83 74 - 99 mg/dL    BUN 10 7.0 - 18 MG/DL    Creatinine 0.59 (L) 0.6 - 1.3 MG/DL    BUN/Creatinine ratio 17 12 - 20      GFR est AA >60 >60 ml/min/1.73m2    GFR est non-AA >60 >60 ml/min/1.73m2    Calcium 8.7 8.5 - 10.1 MG/DL   MAGNESIUM    Collection Time: 03/17/22  4:52 AM   Result Value Ref Range    Magnesium 1.8 1.6 - 2.6 mg/dL       Assessment:   Mayra Davis Ofelia Estes is a 38 y.o. female,  day 1 status post Laparoscopic Lisa-en-Y gastric bypass utilizing a 15 mL separate tubularized gastric pouch based on the lesser curvature of the stomach, a 645 cm retrocolic retrogastric Lisa limb, and a 40 cm biliopancreatic limb.    Condition: good    Plan:   -Ambulate every four hours  -Pain managed prior to d/c   -Nausea managed prior to d/c   -Advance to Clear liquid Gastric Bypass Diet, if able to tolerate clear liquid diet (with pro shake) 4oz per hour for 3 hours prior to d/c    If patient continues to progress d/c home later today     Ann Marie Gastelum NP  8:28 AM  3/17/2022

## 2022-03-17 NOTE — PROGRESS NOTES
Educated pt on how to administer lovenox. Pt demonstrated understanding by administering it to herself with RN supervision.

## 2022-03-17 NOTE — ROUTINE PROCESS
Pt ambulating in the mehta. No acute distress noted. [id number] was educated on all discharge instructions below. He/she understood and was provided a copy. He/she knows who to call for any issues post discharge. Hydration  Hydration is your NUMBER ONE priority. Dehydration is the most common reason for readmission to the hospital. Dehydration occurs when  your body does not get enough fluid to keep it functioning at its best. Your body also requires fluid  to burn its stored fat calories for energy. Carry a bottle of water with you all day, even when you are away from home; remind yourself to  drink even if you dont feel thirsty. Drinking 64 ounces of fluid is your daily goal. You can tell if  youre getting enough fluid if youre making clear, light-colored urine five to 10 times per day. Signs of dehydration can be thirst, headache, hard stools or dizziness upon sitting or standing up. You should contact your surgeons office if you are unable to drink enough fluid to stay hydrated. --   [facility] after Surgery   No lifting over 15 pounds for four weeks.  No driving while taking the pain medication (about seven to 10 days).  No tub baths, swimming or hot tubs until incisions are healed (about two weeks).  You may shower. Clean incisions daily /gently with soap and check incisions for signs of infection:  -- Redness around incision. -- Swelling at site. -- Drainage with an foul odor (pus). -- Increase tenderness around incision.  Take your temperature and resting pulse in the morning and evening. Record on tracking form  given to you. Call if your temperature is greater than 101 or your pulse rate is greater than 115.  Please contact your surgeon if you are having excessive abdominal pain (that lasts longer than  four hours and does not improve with prescribed pain medication), vomiting or shortness of breath.    Get up and move -- do not sit in one place for more than an hour.  You need to WALK (EXERCISE) for 30 minutes per day. -- Walking around your house does not count. -- Bike, treadmill and elliptical are OK. -- NO weight lifting or sit ups.  If constipated take an adult dose of Miralax (available over the counter). Contact the doctors  office if Miralax doesnt help.  You may swallow pills starting the day after surgery as long as they fit inside this [place]:   Continue to use your incentive spirometer (breathing machine) for the next couple of weeks to  help prevent pneumonia. [address]  Temperature/Heart Rate Log  Take your temperature and heart rate (pulse) twice a day for 14 days. Take both in the morning and  evening at about the same time each day (when you wake up and before you go to bed when you  are relaxed). Please contact your doctors office if your:   Temperature is higher than 101 degrees.  Heart rate (pulse) is higher than 115 beats per minute  (normal heart rate is 60 to 100 beats per minute). How to Take Your Heart Rate (Pulse)   Turn your left hand so that your palm is face-up.  With the index and middle fingers of your right  hand, draw a line from the base of your thumb to  just below the crease in your wrist. Your fingers  should nestle just to the left of the large tendon that  pops up when you bend your wrist toward you.  Dont press too hard, that will make the pulse go  away. Use gentle pressure.  Wait. It can take several seconds -- and several micro-adjustments in the placement of your two  fingers on your wrist -- to find your pulse. Just keep moving your fingers down or up your wrist  in small increments (and pausing for a few seconds) until you find it. How to Take Your Pulse Rate   Find a watch with a second hand and place it on your right wrist or on the table next  to your left hand.  After finding your pulse, count the number of beats for 20 seconds.    Multiply by three to get your heart rate, or beats per minute  (or just count for 60 seconds for a math-free option).  Normal, resting heart rate is about 60 to 100 beats per minute. Lovenox Self Injection Guide  Prepare  Step 1: Wash and dry your hands thoroughly. Step 2: Sit or lie in a comfortable position and choose an area  on the right or left side of the abdomen at least two inches away  from the belly button. Step 3: Clean the injection site with an alcohol swab and let dry. Inject  Step 4: Remove the needle cap by pulling it straight off the syringe and  discard it in a sharps . Step 5: With your other hand, pinch an inch of the cleansed area to  make a fold in the skin. Insert the full length of the needle straight  down -- at a 90° angle -- into the fold of skin. [address]  Step 6: Press the plunger with your thumb until the syringe is empty. Then pull the needle straight out and release the skin fold. Dispose  Step 7: Point the needle down and away from yourself and others,  and then push down on the plunger to activate the safety shield. Step 8: Place the used syringe in the sharps . Do NOT expel the air bubble from the syringe before the injection. Administration should be alternated between the left and right abdominal wall. The whole length  of the needle should be introduced into a skin fold held between the thumb and forefinger; the  skin fold should be held throughout the injection. To minimize bruising, do not rub the injection  site after completion of the injection. Questions about LOVENOX? Call [phone]    9. DIET AND LIFESTYLE  Key Diet Principles Following Bariatric Surgery   Begin each meal with soft moist high protein foods (i.e. chicken, turkey, yogurt, tuna, eggs,  cottage cheese, other fish and seafood).  Consume a minimum of 64 ounces of fluid each day to prevent dehydration. No straws.  No food and fluid together.  Stop drinking 30 minutes before a meal. You may begin fluids again  30 minutes after you finish a meal.   Eat very slowly and chew all foods completely.  Keep portions small.  No simple sugars or high fat foods. No carbonated beverages. No caffeine.  Eat three meals per day. No skipping. Avoid snacking between meals.  No alcohol. No smoking.  Two Flintstones Complete Chewable vitamins each day. Take one in the morning and one at night.  1,500 milligrams Calcium Citrate per day in separate dosages.  Vitamin D 3: 5,000 IU taken per day.  Vitamin B-12: Take 1,000 micrograms sublingually daily.  Iron: 60 milligrams per day from [employer].  Protein supplements of your choice. Must be low sugar (0 to 3 grams), low fat (0 to 3 grams) and  provide at least 35 to 40 grams of protein each day. You need 60 to 70 grams of protein (food  and supplements) each day.  Minimum of 30 minutes of physical activity daily. Do not take NSAIDS . Do not take Steroids without your surgeons permission. Your Priorities After Surgery  ? Fluid: 64 ounces of fluid per day. ? Protein: 60 to 70 grams of protein per day. ? Walk every day. Clear Liquid Diet  One week of clear liquids: minimum of 64 ounces of fluid per day. Fluid:   Zero calorie liquids.  No caffeine.  No carbonation.  No sugary drinks.  No alcohol.  No straws. Food   Protein drinks.  Less than 3 grams of sugar and  3 grams of fat per serving.  Protein drink should provide you with  60 to 70 grams of protein. Soft Protein Diet  Five weeks of soft protein (1 ounce for soft protein, 3 ounces of yogurt/cottage cheese). Three meals per day and 1 protein shake. Protein shakes should provide you with 30 grams of  protein on the soft protein diet. Slow transition:   First week on soft protein diet -- focus on yogurt, cottage cheese, eggs, vegetarian refried beans,  black beans, kidney beans and white beans.  (NO BAKED BEANS.)   Second through fourth week on soft protein diet -- focus on yogurt, cottage cheese, eggs,  canned tuna, canned chicken, tilapia and fish (needs to be soft enough to be cut up with a fork).  Fifth week on soft protein diet -- focus on yogurt, cottage cheese, eggs, canned tuna, canned  chicken, tilapia, fish, salmon, chicken breast or turkey. Fluid is your #1 Priority! Continue clear liquids between meals. You will need 64 ounces of fluid per day. Fluids that you can have include:   Water.  Zero calorie liquids. You will need to sip throughout the day and should therefore have a water bottle with you at all  times! No liquids with your meals. Stop 30 minutes before a meal and wait 30 minutes after a meal.ugary drinks. No alco  Protein  You will need 60 to 70 grams of protein per day.  60 to 70 grams of protein shakes when on the clear liquid diet (two to three shakes per day).  30 to 50 grams of protein shakes when on the soft protein diet (one shake per day). Eat Three Times Per Day  You will need to eat three times per day. My planned times are:  _________________________________________________________  _________________________________________________________  _________________________________________________________  Nausea, Vomiting, Stomach Pain  If you have problems with nausea, vomiting or stomach pain, try:   Eating slowly: 20 to 30 minutes per meal.   Chewing food thoroughly: 20 to 30 chews before food is swallowed.  Small portions: measure portions in medicine cup.  Stopping before feeling full.  AVOIDING SUGAR and FRIED FOOD: sugar will cause dumping syndrome and lead to weight gain. Exercise  I will need to get a minimum of 30 minutes of exercise per day or 150 minutes of exercise per week.  Walking, swimming, biking or elliptical.   Find something you enjoy! Vitamins  After surgery, you will need to take the following vitamins for the rest of your life -- FOREVER.    Vitamin D 3: 5,000 IU per day.  Calcium Citrate: 1,500 milligrams, taken separately.  Flintstones Complete: two per day, taken separately.  Sublingual Vitamin B-12: 1,000 micrograms daily.  Iron for menstruating women or patients with a history of low iron: 65 milligrams daily. We recommend going to [email or web address] and purchasing iron from there. The lemon-lime has 60 milligrams. This iron is better absorbed than over-the-counter iron. Clear Liquid Log  Getting your fluid in is top priority during this week. Fluids (MINIUM of 64 ounces per day):  ? 8 oz. ? 8 oz. ? 8 oz. ? 8 oz. ? 8 oz. ? 8 oz. ? 8 oz. ? 8 oz. ? 8 oz. ? 8 oz. ? 8 oz. ? 8 oz. Flintstones Complete Chewable: ? a.m. ? p.m. Calcium Citrate (1,500 milligrams/day):  Pill form  ? Two crushed pills (morning) ? Two crushed pills (afternoon) ? Two crushed pills (evening)  OR Upcal D (powder)  ? One pack/scoop ? One pack/scoop ? One pack/scoop  OR Celebrate Chewable Vitamins (500 mg each) or Bariatric Advantage Chewables (500 mg)  ? One chewable (morning) ? One chewable (afternoon) ? One chewable (evening)  OR Liquid Calcium Citrate  ? 1 tbsp. Calcium Citrate ? 1 tbsp. Calcium Citrate ? 1 tbsp. Calcium Citrate  Vitamin D3: ? 5,000 IU daily. Vitamin B-12: ? 1,000 micrograms per day. Iron (menstruating women or patients with a history of low iron):  ? 60 milligrams per day from [employer]. Protein drinks (protein drinks should be under 3 grams of sugar and 3 grams of fat). Protein shake (60 grams per day): ? a.m. ? p.m. Exercise: ? 30 to 40 minutes per day. Bariatric Soft and Moist Diet Shopping List   alcium Citrate (1,500 milligrams/day):  Pill form  ? Two crushed pills (morning) ? Two crushed pills (afternoon) ? Two crushed pills (evening)  OR Upcal D (powder)  ? One pack/scoop ? One pack/scoop ? One pack/scoop  OR Celebrate Chewable Vitamins (500 mg each) or Bariatric Advantage Chewables (500 mg)  ? One chewable (morning) ?  One chewable (afternoon) ? One chewable (evening)  OR Liquid Calcium Citrate  ? 1 tbsp. Calcium Citrate ? 1 tbsp. Calcium Citrate ? 1 tbsp. Calcium Citrate  Vitamin D3: ? 5,000 IU daily  Vitamin B-12: ? 1,000 micrograms per day  Iron (menstruating women or  patients with a history of low iron):  ? 60 milligrams per day  from [employer]  Protein drinks (protein drinks should be under  3 grams of sugar and 3 grams of fat). Protein shake (30 to 40 grams per day):  ? a.m. ? p.m. Exercise: ? 30 to 40 minutes per  Educated on Diet Progression    [facility] Gastric Bypass and Sleeve Dietary Progression    Patient Name:   Date of Surgery: Ice Chips start once admitted on floor. Begin Bariatric Clear Liquid Diet on:     Clear Liquid Diet: 64 oz. of fluid per day  Low calorie, low sugar, non-carbonated beverages  Water, Crystal Light, Propel Water, Sugar Free Jell-O, Sugar Free Popsicles, Bouillon  Start protein supplement during this stage. (60-70 grams per day)  Getting your fluid in and staying hydrated is your #1 priority! The clear liquid diet will last for 7 days. Begin Bariatric Soft and Moist on: This stage of the diet will last for 5 weeks, unless otherwise instructed by your surgeon. Begin:  1 week post-op   End:  6 weeks post-op (or when you follow up with the Registered Dietitian)    Soft, moist, high protein foods: 3 meals per day plus protein supplements. Portions should emphasize on soft protein. Portions will be a MAXIMUM of:   1 ounce of solid food   2-3 ounces of cottage cheese and yogurt. Protein supplements should be between meals and provide 30-40 grams per day during soft protein diet. Continue to get 64 ounces of fluid in per day. Protein foods that are ok on the Soft and Moist Diet include:  Slow transition:  1st week on soft protein should focus on:  Yogurt, cottage cheese, eggs  2nd -4th  week on soft protein diet should focus on: yogurt, cottage cheese, eggs, canned tuna, canned chicken, tilapia, fish (needs to be soft enough to be cut up with a fork)  5th week on soft protein diet should focus on: Yogurt, cottage cheese, eggs, canned tuna, canned chicken, tilapia, fish, salmon, chicken breast, or turkey. Remember to continue to get 64 ounces of fluid daily on ALL Stages. To be advanced to Bariatric Maintenance Stage of the bariatric diet, follow up with the dietitian 6 weeks post-op, around:         For any additional questions, please refer to your blue binder that was provided to you at the start of the bariatric program.

## 2022-03-21 ENCOUNTER — TELEPHONE (OUTPATIENT)
Dept: SURGICAL ICU | Age: 42
End: 2022-03-21

## 2022-03-21 NOTE — TELEPHONE ENCOUNTER
Pt c/o rlq burning. Pt stated that it happens in the middle of the night. Pt instructed to call the office if burning doesn't get any better.

## 2022-03-21 NOTE — TELEPHONE ENCOUNTER
Pt states that she is getting in 64 oz of fluid. No c/o nausea. Pt is ambulating. Pt still has not had a bowel movement. Pt is ambulating. Will continue to  Follow up with pt.

## 2022-03-22 ENCOUNTER — OFFICE VISIT (OUTPATIENT)
Dept: SURGERY | Age: 42
End: 2022-03-22
Payer: MEDICAID

## 2022-03-22 VITALS
HEIGHT: 68 IN | OXYGEN SATURATION: 97 % | TEMPERATURE: 97.5 F | BODY MASS INDEX: 38.8 KG/M2 | WEIGHT: 256 LBS | RESPIRATION RATE: 16 BRPM | SYSTOLIC BLOOD PRESSURE: 106 MMHG | HEART RATE: 95 BPM | DIASTOLIC BLOOD PRESSURE: 68 MMHG

## 2022-03-22 DIAGNOSIS — E66.9 OBESITY (BMI 30-39.9): ICD-10-CM

## 2022-03-22 DIAGNOSIS — Z98.84 S/P GASTRIC BYPASS: ICD-10-CM

## 2022-03-22 DIAGNOSIS — R10.31 RLQ ABDOMINAL PAIN: Primary | ICD-10-CM

## 2022-03-22 PROCEDURE — 99024 POSTOP FOLLOW-UP VISIT: CPT | Performed by: NURSE PRACTITIONER

## 2022-03-22 RX ORDER — IBUPROFEN 200 MG
200 CAPSULE ORAL DAILY
COMMUNITY

## 2022-03-22 NOTE — PROGRESS NOTES
Bariatric Postoperative Progress Note    CC: Abd Pain    Ernestnia Dalton is a 38 y.o. female now 6 days status post laparoscopic gastric bypass surgery performed on 3/16/2022. Doing well overall. Taking in 64oz SF fluids including 2 protein shakes. 30min of walking daily. Bowel movements are constipated. She is compliant with multivitamins, calcium, Vit D and B12 supplements. Reports burning/tearing pain in RLQ that started after surgery. It is constant and will worsen when laying down. Feels better when moving, such as walking around. 1. Have you been to the ER, urgent care clinic since your last visit? Hospitalized since your last visit? No    2. Have you seen or consulted any other health care providers outside of the [facility] [place] [address] since your last visit? Include any pap smears or colon screening. No      Weight Loss Metrics 3/22/2022 3/22/2022 3/16/2022 2022 2022 2021 2021   Pre op / Initial Wt 265 - - 265 - 266 -   Today's Wt - 256 lb 268 lb 14.4 oz - 265 lb - 266 lb   BMI - 38.92 kg/m2 40.89 kg/m2 - 40.29 kg/m2 - 40.45 kg/m2   Ideal Body Wt 143 - - 143 - 142 -   Excess Body Wt 122 - - 122 - 124 -   Goal Wt 167 - - 167 - 167 -   Wt loss to date 9 - - 0 - 0 -   % Wt Loss 0.09 - - 0 - 0 -   80% EBW 97.6 - - 97.6 - 99.2 -       Past Medical History:   Diagnosis Date    Anemia     receives iron infusions    Asthma     Last attack     GERD (gastroesophageal reflux disease)     Hypertension     Morbid obesity (HCC)        Past Surgical History:   Procedure Laterality Date    HX  SECTION      HX CHOLECYSTECTOMY      HX COLONOSCOPY      HX ENDOSCOPY      HX GASTRIC BYPASS  2022       Current Outpatient Medications   Medication Sig Dispense Refill    calcium citrate 200 mg (950 mg) tablet Take 200 mg by mouth daily.  multivit-min/iron/folic acid/K (BARIATRIC MULTIVITAMINS PO) Take  by mouth.       hyoscyamine SL (LEVSIN/SL) 0.125 mg SL tablet 1 Tablet by SubLINGual route every six (6) hours as needed for PRN Reason (Other) (Spasms). 12 Tablet 0    ondansetron (ZOFRAN ODT) 4 mg disintegrating tablet Take 1 Tablet by mouth every eight (8) hours as needed for Nausea or Vomiting. 12 Tablet 0    enoxaparin (LOVENOX) 40 mg/0.4 mL 0.4 mL by SubCUTAneous route daily. 7 Each 0       Allergies   Allergen Reactions    Iron Other (comments)     Burning per pt reports    Iron Other (comments)     Body feels like on fire    Zoloft [Sertraline] Other (comments)     Burning per pt reports       ROS:  Review of Systems   Constitutional: Positive for weight loss. Respiratory: Negative. Cardiovascular: Negative. Gastrointestinal: Positive for abdominal pain, constipation and nausea. Negative for diarrhea, heartburn and vomiting. Genitourinary: Negative. Skin: Negative. Neurological: Negative for dizziness, tingling and headaches. Physicial Exam:  Visit Vitals  /68   Pulse 95   Temp 97.5 °F (36.4 °C)   Resp 16   Ht 5' 8\" (1.727 m)   Wt 116.1 kg (256 lb)   SpO2 97%   BMI 38.92 kg/m²     Physical Exam  Vitals and nursing note reviewed. Constitutional:       Appearance: She is obese. HENT:      Head: Normocephalic and atraumatic. Abdominal:      General: Bowel sounds are normal. There is no distension. Palpations: Abdomen is soft. There is no mass. Tenderness: There is no abdominal tenderness. Hernia: No hernia is present. Comments: Surgical sites approximated and healing well, no drainage or erythema. Musculoskeletal:         General: Normal range of motion. Skin:     General: Skin is warm and dry. Neurological:      General: No focal deficit present. Mental Status: She is alert and oriented to person, place, and time. Psychiatric:         Mood and Affect: Mood normal.         Behavior: Behavior normal.         Labs:   Recent Results (from the past 672 hour(s))   [facility]. Collection Time: 03/11/22  1:18 PM   Result Value Ref Range    [facility] SPECIMEN COL Specimens collected/sent to [facility]     NOVEL CORONAVIRUS (COVID-19)    Collection Time: 03/11/22  1:23 PM   Result Value Ref Range    SARS-CoV-2 Not Detected Not Detect   HCG URINE, QL. - POC    Collection Time: 03/16/22  6:27 AM   Result Value Ref Range    Pregnancy test,urine (POC) Negative NEG     CBC W/O DIFF    Collection Time: 03/17/22  4:52 AM   Result Value Ref Range    WBC 8.2 4.6 - 13.2 K/uL    RBC 4.50 4. 20 - 5.30 M/uL    HGB 11.3 (L) 12.0 - 16.0 g/dL    HCT 35.6 35.0 - 45.0 %    MCV 79.1 78.0 - 100.0 FL    MCH 25.1 24.0 - 34.0 PG    MCHC 31.7 31.0 - 37.0 g/dL    RDW 22.7 (H) 11.6 - 14.5 %    PLATELET 431 608 - 721 K/uL    MPV 11.7 9.2 - 11.8 FL    NRBC 0.0 0  WBC    ABSOLUTE NRBC 0.00 0.00 - 2.82 K/uL   METABOLIC PANEL, BASIC    Collection Time: 03/17/22  4:52 AM   Result Value Ref Range    Sodium 140 136 - 145 mmol/L    Potassium 4.2 3.5 - 5.5 mmol/L    Chloride 110 100 - 111 mmol/L    CO2 24 21 - 32 mmol/L    Anion gap 6 3.0 - 18 mmol/L    Glucose 83 74 - 99 mg/dL    BUN 10 7.0 - 18 MG/DL    Creatinine 0.59 (L) 0.6 - 1.3 MG/DL    BUN/Creatinine ratio 17 12 - 20      GFR est AA >60 >60 ml/min/1.73m2    GFR est non-AA >60 >60 ml/min/1.73m2    Calcium 8.7 8.5 - 10.1 MG/DL   MAGNESIUM    Collection Time: 03/17/22  4:52 AM   Result Value Ref Range    Magnesium 1.8 1.6 - 2.6 mg/dL       Assessment/Plan:   Down 9 lbs in 6 days (9% of EBW)  Can try ice on area of pain, take Tylenol prn. Nothing palpated on exam. Area of concern is not near any lap sites and pt denies injecting Lovenox in area. Will order abd US to further evalutate. Most likely due to first injection of Lovenox before surgery or minor nerve damage from umbilical lap site. Wound care discussed, continue with Lovenox. Start stage two diet tomorrow. Continue with fluids and walking.    Proceed with follow up with Dr. Beena Snider on 3/31    The primary encounter diagnosis was RLQ abdominal pain. Diagnoses of S/P gastric bypass and Obesity (BMI 30-39. 9) were also pertinent to this visit. Follow-up and Dispositions    · Return in about 9 days (around 3/31/2022) for Post-op follow up with Dr. Fredy Moses. sooner as needed.   Francoise Moore, NP

## 2022-03-22 NOTE — PATIENT INSTRUCTIONS
Central scheduling will call you to schedule your testing. Please allow 24-48 hours for them to contact you. IF you don't hear from them within that time frame please call them directly at #[phone].

## 2022-03-24 ENCOUNTER — TELEPHONE (OUTPATIENT)
Dept: SURGICAL ICU | Age: 42
End: 2022-03-24

## 2022-03-24 PROBLEM — E66.01 MORBID OBESITY WITH BMI OF 40.0-44.9, ADULT (HCC): Status: ACTIVE | Noted: 2022-03-16

## 2022-03-24 NOTE — TELEPHONE ENCOUNTER
Pt is getting in 64 oz of fluid. Pt states that she has an ultrasound appointment on 4/1 for the burning on the rlq.

## 2022-03-30 ENCOUNTER — TELEPHONE (OUTPATIENT)
Dept: SURGERY | Age: 42
End: 2022-03-30

## 2022-03-30 NOTE — TELEPHONE ENCOUNTER
Patient states had LGBP 03/16/22 with Dr Lundy Prader. Patient states she had a caregiver stay with her at her home to help her after surgery. Patient states caregiver had a stroke 2 days ago and she partially lifted caregiver. Patient states she was helping caregiver by helping her walk while she leaned on her causing her to carry most of her weight. Patient states during this event she was not drinking a lot of water or protein shakes. Patient states she is now experiencing abd pain and dizziness. Patient states she does feel any abnormal bulges knots but states she is not feeling around her stomach all day so really does not know what she is looking for. Patient states incisions are mostly healed but very itchy. Patient states she drinks 2 bottles of water a day and 2 bottles of protein shakes, no issues with taking vitamins. Patient states she is dizzy when she first stand then it subsides. Patient states she is able to function through out the day-    Patient has an appt with Dr. Lundy Prader tomorrow for first post op. Patient want to know if she need to be seen today or can she wait until tomorrow. I advised patient she can wait until tomorrow but I also advised I will forward message to [facility] for 2nd opinion. Patient verbalized understanding.

## 2022-03-31 ENCOUNTER — OFFICE VISIT (OUTPATIENT)
Dept: SURGERY | Age: 42
End: 2022-03-31
Payer: MEDICAID

## 2022-03-31 VITALS
HEIGHT: 68 IN | TEMPERATURE: 97 F | HEART RATE: 77 BPM | OXYGEN SATURATION: 96 % | BODY MASS INDEX: 37.89 KG/M2 | SYSTOLIC BLOOD PRESSURE: 118 MMHG | WEIGHT: 250 LBS | DIASTOLIC BLOOD PRESSURE: 75 MMHG

## 2022-03-31 DIAGNOSIS — K91.2 POSTOPERATIVE INTESTINAL MALABSORPTION: Primary | ICD-10-CM

## 2022-03-31 PROCEDURE — 99024 POSTOP FOLLOW-UP VISIT: CPT | Performed by: SURGERY

## 2022-03-31 NOTE — PROGRESS NOTES
Bariatric Follow-Up Evaluation    Ernestina Mena is a 38 y.o. white female who presents in follow-up status post laparoscopic gastric bypass March 16, 2022 noting expected decrease incisional discomfort no longer requiring analgesics with the only other complaint being right lower calf/ankle discomfort which has been resolving gradually. Compliant with and tolerant of a early post gastric bypass diet meeting both fluid and protein goals. Patient notes appropriate early satiety with no specific food intolerances or pathologic emesis and has not experienced dumping syndrome as she is avoiding high density carbohydrates. Compliant with vitamin supplementation protocol  Exercise regimen: Walking at least 30 minutes on daily basis  Comorbidities: Hypertension, gastroesophageal reflux disease, reactive airway disease, stress urinary incontinence, clinical obstructive sleep apnea-resolved                           weight related arthropathy-improved  Preoperative weight: 265  Current weight: 250.   BMI: 38.  Estimated postsurgical weight loss: 98  Current weight loss: 15  Goal weight: 67  Percentage weight loss: 15% / 15 days    Weight Loss Metrics 3/31/2022 3/31/2022 3/22/2022 3/22/2022 3/16/2022 2/17/2022 2/17/2022   Pre op / Initial Wt 265 - 265 - - 265 -   Today's Wt - 250 lb - 256 lb 268 lb 14.4 oz - 265 lb   BMI - 38.01 kg/m2 - 38.92 kg/m2 40.89 kg/m2 - 40.29 kg/m2   Ideal Body Wt 143 - 143 - - 143 -   Excess Body Wt 122 - 122 - - 122 -   Goal Wt 167 - 167 - - 167 -   Wt loss to date 15 - 9 - - 0 -   % Wt Loss 0.15 - 0.09 - - 0 -   80% EBW 97.6 - 97.6 - - 97.6 -       Allergies   Allergen Reactions    Iron Other (comments)     Burning per pt reports    Iron Other (comments)     Body feels like on fire    Zoloft [Sertraline] Other (comments)     Burning per pt reports       Current Outpatient Medications on File Prior to Visit   Medication Sig Dispense Refill    calcium citrate 200 mg (950 mg) tablet Take 200 mg by mouth daily.  multivit-min/iron/folic acid/K (BARIATRIC MULTIVITAMINS PO) Take  by mouth.  hyoscyamine SL (LEVSIN/SL) 0.125 mg SL tablet 1 Tablet by SubLINGual route every six (6) hours as needed for PRN Reason (Other) (Spasms). (Patient not taking: Reported on 3/31/2022) 12 Tablet 0    ondansetron (ZOFRAN ODT) 4 mg disintegrating tablet Take 1 Tablet by mouth every eight (8) hours as needed for Nausea or Vomiting. (Patient not taking: Reported on 3/31/2022) 12 Tablet 0    enoxaparin (LOVENOX) 40 mg/0.4 mL 0.4 mL by SubCUTAneous route daily. (Patient not taking: Reported on 3/31/2022) 7 Each 0     No current facility-administered medications on file prior to visit. Past Medical History:   Diagnosis Date    Anemia     receives iron infusions    Asthma     Last attack     GERD (gastroesophageal reflux disease)     Hypertension     Morbid obesity ([facility] [address]        Past Surgical History:   Procedure Laterality Date    HX  SECTION      HX CHOLECYSTECTOMY      HX COLONOSCOPY      HX ENDOSCOPY      HX GASTRIC BYPASS  2022       Social History     Tobacco Use    Smoking status: Former Smoker     Quit date: 10/11/2000     Years since quittin.4    Smokeless tobacco: Never Used   Vaping Use    Vaping Use: Never used   Substance Use Topics    Alcohol use: Yes     Comment: Rare    Drug use: Never       Family History   Problem Relation Age of Onset    Stroke Mother     Obesity Father     Cancer Father         cancer  throat    Cancer Maternal Grandmother     Breast Cancer Maternal Grandmother         60's       ROS:  General: Negative for fevers, chills, night sweats, fatigue, weight loss, or weight gain. GI: Negative for abdominal pain, change in bowel habits, hematochezia, melena, or GERD. Integumentary: Negative for dermatitis or abnormal moles.     HEENT: Negative for visual changes, vertigo, epistaxis, dysphagia, or hoarseness    Cardiac: Negative for chest pain, palpitations, hypertension, edema, or varicosities    Resp: Negative for cough, shortness of breath, wheezing, hemoptysis, snoring, or reactive airway disease    : Negative for hematuria, dysuria, frequency, urgency, nocturia, or stress urinary incontinence    MSK:  Negative for weakness, joint pain, or arthritis    Endocrine: Negative for diabetes, thyroid disease, polyuria, polydipsia, polyphagia, poor wound, heat intolerance, or cold intolerance. Lymph/Heme: Negative for anemia, bruising, or history of blood transfusions. Neuro:  Negative for dizziness, headache, fainting, seizures, and stroke. Psychiatry:  Negative for anxiety or depression    Physical Exam    Visit Vitals  /75 (BP 1 Location: Right arm, BP Patient Position: Sitting)   Pulse 77   Temp 97 °F (36.1 °C)   Ht 5' 8\" (1.727 m)   Wt 113.4 kg (250 lb)   SpO2 96%   BMI 38.01 kg/m²       Nursing note reviewed. General: 38 y.o. female is in no acute distress. Head: Normocephalic, atraumatic  Resp: Clear to auscultation bilaterally, no wheezing, rhonchi, or rales, excursions normal and symmetrical.  Cardio: Regular rate and rhythm, no murmurs, clicks, gallops, or rubs. No edema or varicosities. Abdomen: Obese, soft, nontender, nondistended, normoactive bowel sounds, wounds clean dry approximated with appropriate surrounding induration incisional tenderness without evidence of infectious complications or incisional hernia  Psych: Alert and oriented to person, place, and time.     Impression    Status post laparoscopic Lisa-en-Y gastric bypass March 16, 2022 with appropriate weight loss and comorbidity resolution      Plan    Formal bariatric nutrition evaluation for assistance with advancement of diet  Continue compliance with the early post gastric bypass diet with advancement per bariatric nutrition, continue multivitamin and exercise protocol, encourage monthly attendance at bariatric support group meetings  Follow-up June 2016 with 3-month labs for ongoing bariatric surgical evaluation

## 2022-03-31 NOTE — PROGRESS NOTES
Larisa Partida is a 38 y.o. female (: 1980) presenting to address:    Chief Complaint   Patient presents with    Surgical Follow-up     LGBP 3/16/22       Medication list and allergies have been reviewed with Larisaede Partida and updated as of today's date. I have gone over all Medical, Surgical and Social History with Larisa Partida and updated/added the information accordingly. 1. Have you been to the ER, Urgent Care or Hospitalized since your last visit? NO      2. Have you followed up with your PCP or any other Physicians since your procedure/ last office visit?    NO

## 2022-04-04 ENCOUNTER — TELEPHONE (OUTPATIENT)
Dept: SURGERY | Age: 42
End: 2022-04-04

## 2022-04-04 NOTE — TELEPHONE ENCOUNTER
Patient called to say that she was told to cancel CT because everything was fine, which she did. However, she ended up in the ER over the weekend. She would like to speak with Vandana.

## 2022-04-04 NOTE — TELEPHONE ENCOUNTER
Hiro Chapin NP  Corby Khan    Reviewed ER note and labs, she went in for leg pain. Negative venous doppler and labs looked good. Please call, did she have further concerns? Returned patients phone call regarding leg pain. Patient states she is still experiencing pain and is concerned because her labs were elevated while in the hospital. Per Srini Mo NP, I informed the patient that her labs and ER note showed no signs of a blood clots. Advised patient to continue drinking water, get in 60g of protein, take vitamins daily, and walk at least 30 minutes a day. Also, advised the patient if pain gets any worse go to the ER.

## 2022-04-11 ENCOUNTER — TELEPHONE (OUTPATIENT)
Dept: SURGERY | Age: 42
End: 2022-04-11

## 2022-04-11 NOTE — TELEPHONE ENCOUNTER
Patient stated that she getting her protein in. She said around 60 and the last 2 days she said around 48 oz of water. She has stopped the blood medication.

## 2022-04-11 NOTE — TELEPHONE ENCOUNTER
Advised per Kizzy Sport NP. Continue to monitor and keep pushing the fluids and protein. Patient stated understanding.

## 2022-04-11 NOTE — TELEPHONE ENCOUNTER
Patient called stating her blood pressure this morning was 88/64. She said her blood pressure has been low since surgery but not this low. Patient stated that she has been very dizzy for the last week. She has not gotten the 64 oz of water the last 2 days and she is urinating 5 times a day and it is very dark. She had bypass on 3/16/22.

## 2022-04-27 ENCOUNTER — DOCUMENTATION ONLY (OUTPATIENT)
Dept: BARIATRICS/WEIGHT MGMT | Age: 42
End: 2022-04-27

## 2022-04-27 ENCOUNTER — HOSPITAL ENCOUNTER (OUTPATIENT)
Dept: BARIATRICS/WEIGHT MGMT | Age: 42
Discharge: HOME | End: 2022-04-27

## 2022-04-27 NOTE — PROGRESS NOTES
[facility]  POST-OP NUTRITION FOLLOW UP    Patient's Name: Sari Acevedo  YOB: 1980  Surgery Date: March 15 2022     Procedure: gastric bypass    Surgeon: kar    Height: 68.75   inches     Pre-Op Weight: 275     Current Weight: 238 lbs  Weight Lost: 37 lbs    BMI:  35.4  Goal Weight: 167 lbs   BMI: 24.9    Attendance of support group: send you link    Complications  Readmittance: no  Reoperations: no  Complications: no  IV Fluids: no  ER Trips: thought had blood clot and you went [facility] had ultra sound and did not find anything    Problem Areas:   Nausea: everything  Vomiting: none  Dumping Syndrome: none  Inadequate Protein: 40 grams   Inadequate Fluids:no  Food Intolerance: some  Hunger: yes probably too long in between meals  Constipation: use to do once a day    Eating 3 Meals/Day: yes  Portion Size at Meals: 1 oz    Protein from Food:  Alg[address] being consumed: chicken is ok eggs, black beans, turkey lunch meat  Breakfast: Time:  8 - 9 am  protein shake  Lunch: Time: 1pm  Boiled egg, works depends on where she is   Dinner:  Time: 5-6 pm 1 ounce salmon  In-between eating:     Length of time for meals: 20 minutes    food/fluids: yes    Fluids:  64 oz/day   Types of Fluids: S/F packets Gatorade zero    MVI: Bariatric Pro care vitamins   Number/Day: one     Calcium: Bariatric Chew    Calcium Dosin   Taken Separately: 3    Iron: infusions due to history of low iron   Iron dosing:   Protein Supplement: Premier    Grams of Protein: 30g   Timing of Protein Drinks:morning  Patient is taking 7 days a week. Exercise: single mom of 7 kids       Comments:      Patient was educated on the importance of eating meat and vegetables only. I have talked with patient about the effects of carbohydrates, not only from a weight management perspective, but also what effects it could have on their blood sugar and what reactive hypoglycemia is. Diet Follow Up:  9 month class scheduled for: November 18 at 10 a,    Bushra To RDN    4/27/2022

## 2022-04-30 DIAGNOSIS — K91.2 POSTOPERATIVE INTESTINAL MALABSORPTION: ICD-10-CM

## 2022-06-09 ENCOUNTER — HOSPITAL ENCOUNTER (OUTPATIENT)
Dept: LAB | Age: 42
Discharge: HOME | End: 2022-06-09

## 2022-06-09 LAB — SENTARA SPECIMEN COL,SENBCF: NORMAL

## 2022-06-09 PROCEDURE — 99001 SPECIMEN HANDLING PT-LAB: CPT

## 2022-06-10 LAB
25(OH)D3 SERPL-MCNC: 48.2 NG/ML (ref 32–100)
ALBUMIN SERPL-MCNC: 4.2 G/DL (ref 3.5–5)
ERYTHROCYTE [DISTWIDTH] IN BLOOD BY AUTOMATED COUNT: 15.2 % (ref 10–15.5)
HCT VFR BLD AUTO: 35.6 % (ref 35.1–46.5)
HGB BLD-MCNC: 11.4 G/DL (ref 11.7–15.5)
IRON,IRN: 21 MCG/DL (ref 30–160)
MCH RBC QN AUTO: 28 PG (ref 26–34)
MCHC RBC AUTO-ENTMCNC: 32 G/DL (ref 31–36)
MCV RBC AUTO: 87 FL (ref 80–99)
PLATELET # BLD AUTO: 177 K/UL (ref 140–440)
PMV BLD AUTO: 12.5 FL (ref 9–13)
RBC # BLD AUTO: 4.11 M/UL (ref 3.8–5.2)
WBC # BLD AUTO: 4.7 K/UL (ref 4–11)

## 2022-06-14 LAB — VITAMIN B1, WHOLE BLOOD, 66250: 81.6 NMOL/L (ref 66.5–200)

## 2022-06-16 ENCOUNTER — OFFICE VISIT (OUTPATIENT)
Dept: SURGERY | Age: 42
End: 2022-06-16
Payer: MEDICAID

## 2022-06-16 VITALS
WEIGHT: 212 LBS | RESPIRATION RATE: 16 BRPM | HEIGHT: 68 IN | TEMPERATURE: 97.4 F | HEART RATE: 68 BPM | BODY MASS INDEX: 32.13 KG/M2 | OXYGEN SATURATION: 100 % | DIASTOLIC BLOOD PRESSURE: 85 MMHG | SYSTOLIC BLOOD PRESSURE: 115 MMHG

## 2022-06-16 DIAGNOSIS — E66.9 OBESITY (BMI 30-39.9): ICD-10-CM

## 2022-06-16 DIAGNOSIS — K91.2 POST-RESECTION MALABSORPTION: ICD-10-CM

## 2022-06-16 DIAGNOSIS — Z98.84 S/P GASTRIC BYPASS: ICD-10-CM

## 2022-06-16 DIAGNOSIS — R11.0 NAUSEA: ICD-10-CM

## 2022-06-16 DIAGNOSIS — K91.2 POST-RESECTION MALABSORPTION: Primary | ICD-10-CM

## 2022-06-16 PROCEDURE — 99213 OFFICE O/P EST LOW 20 MIN: CPT | Performed by: NURSE PRACTITIONER

## 2022-06-16 NOTE — PROGRESS NOTES
Bariatric Postoperative Progress Note    CC: 3 Month LGBP Follow Up    Molly Dougherty is a 38 y.o. female now 3 months status post laparoscopic gastric bypass surgery performed on 3/16/2022. Currently eating fish, eggs, lunch meat, chicken, and cheese. Taking in 60-64oz water,  30g protein. 30-60min of activity 7 days a week. Bowel movements are alternating constipation and regularity, using Miralax. She is compliant with multivitamins, calcium, Vit D and B12 supplements. Started experiencing nausea about a month ago. Nausea will occur with any food or fluid intake, denies vomiting. Getting iron [facility]usions monthly at the [facility]. Experiencing hair loss  Denies any NSAID, ETOH, or tobacco use.      Weight Loss Metrics 2022 2022 3/31/2022 3/31/2022 3/22/2022 3/22/2022 3/16/2022   Pre op / Initial Wt 265 - 265 - 265 - -   Today's Wt - 212 lb - 250 lb - 256 lb 268 lb 14.4 oz   BMI - 32.23 kg/m2 - 38.01 kg/m2 - 38.92 kg/m2 40.89 kg/m2   Ideal Body Wt 143 - 143 - 143 - -   Excess Body Wt 122 - 122 - 122 - -   Goal Wt 167 - 167 - 167 - -   Wt loss to date 53 - 15 - 9 - -   % Wt Loss 0.54 - 0.15 - 0.09 - -   80% EBW 97.6 - 97.6 - 97.6 - -       Comorbidities:  Hypertension: resolved  Diabetes: not applicable  Obstructive Sleep Apnea: resolved  Hyperlipidemia: not applicable  Stress Urinary Incontinence: resolved  Gastroesophageal Reflux: resolved  Weight related arthropathy:improved        Past Medical History:   Diagnosis Date    Anemia     receives iron infusions    Asthma     Last attack     GERD (gastroesophageal reflux disease)     Hypertension     Morbid obesity (HCC)        Past Surgical History:   Procedure Laterality Date    HX  SECTION      HX CHOLECYSTECTOMY      HX COLONOSCOPY      HX ENDOSCOPY      HX GASTRIC BYPASS  2022       Current Outpatient Medications   Medication Sig Dispense Refill    calcium citrate 200 mg (950 mg) tablet Take 200 mg by mouth daily.      multivit-min/iron/folic acid/K (BARIATRIC MULTIVITAMINS PO) Take  by mouth.  ondansetron (ZOFRAN ODT) 4 mg disintegrating tablet Take 1 Tablet by mouth every eight (8) hours as needed for Nausea or Vomiting. (Patient not taking: Reported on 3/31/2022) 12 Tablet 0       Allergies   Allergen Reactions    Iron Other (comments)     Burning per pt reports    Iron Other (comments)     Body feels like on fire    Zoloft [Sertraline] Other (comments)     Burning per pt reports       ROS:  Review of Systems   Constitutional: Positive for malaise/fatigue and weight loss. Eyes: Negative. Respiratory: Negative. Cardiovascular: Negative for chest pain and palpitations. Gastrointestinal: Positive for constipation and nausea. Negative for abdominal pain, blood in stool, diarrhea, heartburn, melena and vomiting. Skin: Negative for itching and rash. Hair loss   Neurological: Positive for dizziness (orthostatic). Negative for tingling, loss of consciousness, weakness and headaches. Physicial Exam:  Visit Vitals  /85   Pulse 68   Temp 97.4 °F (36.3 °C)   Resp 16   Ht 5' 8\" (1.727 m)   Wt 96.2 kg (212 lb)   SpO2 100%   BMI 32.23 kg/m²     Physical Exam  Vitals and nursing note reviewed. Constitutional:       Appearance: She is obese. HENT:      Head: Normocephalic and atraumatic. Cardiovascular:      Rate and Rhythm: Normal rate. Heart sounds: Normal heart sounds. Pulmonary:      Effort: Pulmonary effort is normal.      Breath sounds: Normal breath sounds. Abdominal:      General: Bowel sounds are normal. There is no distension. Palpations: Abdomen is soft. There is no mass. Tenderness: There is abdominal tenderness (epigastric). Hernia: No hernia is present. Musculoskeletal:         General: Normal range of motion. Skin:     General: Skin is warm and dry. Neurological:      General: No focal deficit present.       Mental Status: She is alert and oriented to person, place, and time. Psychiatric:         Mood and Affect: Mood normal.         Behavior: Behavior normal.         Labs:   Recent Results (from the past 672 hour(s))   [address] [facility]. Collection Time: 06/09/22  1:15 PM   Result Value Ref Range    [facility] COL Specimens collected/sent to [facility]     IRON    Collection Time: 06/09/22  1:15 PM   Result Value Ref Range    Iron 21 (L) 30 - 160 mcg/dL   VITAMIN D, 25 HYDROXY    Collection Time: 06/09/22  1:15 PM   Result Value Ref Range    VITAMIN D, 25-HYDROXY 48.2 32.0 - 100.0 ng/mL   CBC W/O DIFF    Collection Time: 06/09/22  1:15 PM   Result Value Ref Range    WBC 4.7 4.0 - 11.0 K/uL    RBC 4.11 3.80 - 5.20 M/uL    HGB 11.4 (L) 11.7 - 15.5 g/dL    HCT 35.6 35.1 - 46.5 %    MCV 87 80 - 99 fL    MCH 28 26 - 34 pg    MCHC 32 31 - 36 g/dL    RDW 15.2 10.0 - 15.5 %    PLATELET 407 163 - 384 K/uL    MPV 12.5 9.0 - 13.0 fL   VITAMIN B1, WHOLE BLOOD    Collection Time: 06/09/22  1:15 PM   Result Value Ref Range    VITAMIN B1, WHOLE BLOOD 81.6 66.5 - 200.0 nmol/L   ALBUMIN    Collection Time: 06/09/22  1:15 PM   Result Value Ref Range    Albumin 4.2 3.5 - 5.0 g/dL       Assessment/Plan:   She is currently 3 months s/p laparoscopic gastric bypass surgery with a total weight loss of 53 lbs to date, doing well. Labs were reviewed and pt was instructed to continue MVI/B1/B12/D supplementation. Pt is getting iron infusions with [facility]. Reassurance provided for hair loss. Ensure getting at least 60 g protein daily and taking all recommended bariatric supplementation. Can start biotin. Pt still has Zofran prescribed from hospital stay which she has not tried to help with nausea. Discussed to try Zofran, if no improvement may need to try PPI. Discussed to increase SF fluid intake, increase sodium (via SF electrolyte beverage), and slow position changes to assist with orthostatic hypotension.    We have reviewed the components of a successful postoperative course including requirement for a high protein, low carbohydrate diet, 64 oz a day of zero calorie liquids, daily vitamin supplementation, daily exercise (150 mis/week), regular follow-up, and participation in support groups. Refer to registered dietitian for more detailed medical nutrition therapy as needed. The primary encounter diagnosis was Post-resection malabsorption. Diagnoses of S/P gastric bypass, Obesity (BMI 30-39.9), BMI 32.0-32.9,adult, and Nausea were also pertinent to this visit. Follow-up and Dispositions    · Return in about 3 months (around 9/16/2022). with labs, sooner as needed.   Tylor Norris NP

## 2022-08-18 ENCOUNTER — OFFICE VISIT (OUTPATIENT)
Dept: CARDIOLOGY CLINIC | Age: 42
End: 2022-08-18
Payer: MEDICAID

## 2022-08-18 VITALS
HEART RATE: 55 BPM | OXYGEN SATURATION: 99 % | TEMPERATURE: 97.9 F | SYSTOLIC BLOOD PRESSURE: 122 MMHG | BODY MASS INDEX: 28.62 KG/M2 | WEIGHT: 188.2 LBS | DIASTOLIC BLOOD PRESSURE: 80 MMHG

## 2022-08-18 DIAGNOSIS — R55 POSTURAL DIZZINESS WITH PRESYNCOPE: ICD-10-CM

## 2022-08-18 DIAGNOSIS — R06.09 DOE (DYSPNEA ON EXERTION): ICD-10-CM

## 2022-08-18 DIAGNOSIS — I10 ESSENTIAL HYPERTENSION: ICD-10-CM

## 2022-08-18 DIAGNOSIS — R00.2 PALPITATIONS: Primary | ICD-10-CM

## 2022-08-18 DIAGNOSIS — R42 POSTURAL DIZZINESS WITH PRESYNCOPE: ICD-10-CM

## 2022-08-18 PROCEDURE — 99204 OFFICE O/P NEW MOD 45 MIN: CPT | Performed by: INTERNAL MEDICINE

## 2022-08-18 PROCEDURE — 93000 ELECTROCARDIOGRAM COMPLETE: CPT | Performed by: INTERNAL MEDICINE

## 2022-08-18 NOTE — PATIENT INSTRUCTIONS
Testing   Echo/ Treadmill Stress    Nothing to eat or drink past midnight and no medicaitons the morning of cardiac testing. **call office 3-5 days after testing is completed for results**     [facility]( 30 days) -will be calling you to confirm your address to send your Heart Monitor. Please allow 7-10 business days for monitor to arrive at your home.   Customer Service for [address]:  [phone]

## 2022-08-18 NOTE — PROGRESS NOTES
Identified pt with two pt identifiers(name and ). Reviewed record in preparation for visit and have obtained necessary documentation. Zhao Ricks presents today for   Chief Complaint   Patient presents with    New Patient       Pt c/o palpitations and dizziness            Ernestina Yadavcaty Wright preferred language for health care discussion is english/other. Personal Protective Equipment:   Personal Protective Equipment was used including: mask-surgical and hands-gloves. Patient was placed on no precaution(s). Patient was masked. Precautions:   Patient currently on None  Patient currently roomed with door closed. Is someone accompanying this pt? no    Is the patient using any DME equipment during [address]? no    Depression Screening:  3 most recent PHQ Screens 2022   Little interest or pleasure in doing things Not at all   Feeling down, depressed, irritable, or hopeless Not at all   Total Score PHQ 2 0       Learning Assessment:  Learning Assessment 2021   PRIMARY LEARNER Patient   PRIMARY LANGUAGE ENGLISH   LEARNER PREFERENCE PRIMARY VIDEOS   ANSWERED BY patient   RELATIONSHIP SELF       Abuse Screening:  Abuse Screening Questionnaire 2022   Do you ever feel afraid of your partner? N   Are you in a relationship with someone who physically or mentally threatens you? N   Is it safe for you to go home? Y       Fall Risk  No flowsheet data found. Pt currently taking Anticoagulant therapy? no  Pt currently taking Antiplatelet therapy? no    Coordination of Care:  1. Have you been to the ER, urgent care clinic since your last visit? Hospitalized since your last visit? no    2. Have you seen or consulted any other health care providers outside of the [address] [place] [address] since your last visit? Include any pap smears or colon screening. yes      Please see Red banners under Allergies and Med Rec to remove outside inquires.  All correct information has been verified with patient and added to chart.      Medication's patient's would liked removed has been marked not taking to be removed per Verbal order and read back per Vitaliy Michael MD

## 2022-08-18 NOTE — PROGRESS NOTES
[facility]    Ms. Anthony Beth is 53-year-old female with a history of morbid obesity status post gastric bypass surgery, hypertension, asthma    Patient is here today for cardiac evaluation. She denies any prior history of MI or CHF. Patient was sent to me for the evaluation of palpitation and presyncope. Patient had gastric bypass surgery in 03/2022 for morbid obesity with maximum weight of 295 pounds. Since then she has lost significant amount of weight and now she weighs about 190 pounds. Patient has longstanding history of palpitation especially after standing from sitting position quickly causing some dizziness and then rapid heartbeat. She also had a event monitor suggesting some PVCs. However lately patient has been feeling different symptoms where she feels like she has random episode of rapid heartbeats and palpitation followed by dizziness. She almost feels like she is going to pass out but she has never lost consciousness. She has even almost fell down without loss of consciousness because of this dizziness. She is eating drinking at least 65 ounces of fluid a day. She denies lower extremity swelling. She is very active. She denies any resting or exertional chest pain or chest tightness. She denies any PND. Very randomly she would have some dyspnea along with palpitation. This palpitation has been happening probably every other day  Denies any nausea, vomiting, abdominal pain, fever, chills, sputum production. No hematuria or other bleeding complaints    Past Medical History:   Diagnosis Date    Anemia     receives iron infusions    Asthma     Last attack 2017    GERD (gastroesophageal reflux disease)     H/O gastric bypass 03/2022    Hypertension     Morbid obesity ([facility] [address]        Review of Systems:  Cardiac symptoms as noted above in HPI. All others negative.   Denies fatigue, malaise, skin rash, joint pain, blurring vision, photophobia, neck pain, hemoptysis, chronic cough, nausea, vomiting, hematuria, burning micturition, BRBPR, chronic headaches. Current Outpatient Medications   Medication Sig    calcium citrate 200 mg (950 mg) tablet Take 200 mg by mouth daily. multivit-min/iron/folic acid/K (BARIATRIC MULTIVITAMINS PO) Take  by mouth. ondansetron (ZOFRAN ODT) 4 mg disintegrating tablet Take 1 Tablet by mouth every eight (8) hours as needed for Nausea or Vomiting. (Patient not taking: Reported on 3/31/2022)     No current facility-administered medications for this visit. Past Surgical History:   Procedure Laterality Date    HX  SECTION      HX CHOLECYSTECTOMY      HX COLONOSCOPY      HX ENDOSCOPY      HX GASTRIC BYPASS  2022       Allergies and Sensitivities:  Allergies   Allergen Reactions    Iron Other (comments)     Burning per pt reports    Iron Other (comments)     Body feels like on fire    Zoloft [Sertraline] Other (comments)     Burning per pt reports       Family History:  Family History   Problem Relation Age of Onset    Stroke Mother     Obesity Father     Cancer Father         cancer  throat    Cancer Maternal Grandmother     Breast Cancer Maternal Grandmother         63's       Social History:  Social History     Tobacco Use    Smoking status: Former     Types: Cigarettes     Quit date: 10/11/2000     Years since quittin.8    Smokeless tobacco: Never   Vaping Use    Vaping Use: Never used   Substance Use Topics    Alcohol use: Yes     Comment: Rare    Drug use: Never     She  reports that she quit smoking about 21 years ago. She has never used smokeless tobacco.  She  reports current alcohol use.     Physical Exam:  BP Readings from Last 3 Encounters:   22 122/80   22 115/85   22 118/75         Pulse Readings from Last 3 Encounters:   22 (!) 55   22 68   22 77          Wt Readings from Last 3 Encounters:   22 85.4 kg (188 lb 3.2 oz) 22 96.2 kg (212 lb)   22 113.4 kg (250 lb)       Constitutional: Oriented to person, place, and time. HENT: Head: Normocephalic and atraumatic. Eyes: Conjunctivae and extraocular motions are normal.   Neck: No JVD present. Carotid bruit is not appreciated. Cardiovascular: Regular rhythm. No murmur, gallop or rubs appreciated  Lung: Breath sounds normal. No respiratory distress. No ronchi or rales appreciated  Abdominal: No tenderness. No rebound and no guarding. Musculoskeletal: There is no lower extremity edema. No cynosis  Lymphadenopathy:  No cervical or supraclavicular adenopathy appriciated. Neurological: No gross motor deficit noted. Skin: No visible skin rash noted. No Ear discharge noted  Psychiatric: Normal mood and affect. LABS:   @  Lab Results   Component Value Date/Time    WBC 4.7 2022 01:15 PM    HGB 11.4 (L) 2022 01:15 PM    HCT 35.6 2022 01:15 PM    PLATELET 854  01:15 PM    MCV 87 2022 01:15 PM     Lab Results   Component Value Date/Time    Sodium 140 2022 04:52 AM    Potassium 4.2 2022 04:52 AM    Chloride 110 2022 04:52 AM    CO2 24 2022 04:52 AM    Glucose 83 2022 04:52 AM    BUN 10 2022 04:52 AM    Creatinine 0.59 (L) 2022 04:52 AM     Lipids Latest Ref Rng & Units 2022   Chol, Total 110 - 200 mg/dL 141   HDL >=40 mg/dL 31(L)   LDL 50 - 99 mg/dL 84   Trig 40 - 149 mg/dL 131   Some recent data might be hidden     Lab Results   Component Value Date/Time    ALT (SGPT) 16 2022 09:51 AM     No results found for: HBA1C, [id number], [id number]  Lab Results   Component Value Date/Time    TSH 1.50 2022 09:51 AM     EK2022: Sinus rhythm at 55 bpm.  Sinus bradycardia.   Normal NH and QRS interval.  No ST changes of ischemia    STRESS TEST (EST, PHARM, NUC, ECHO etc)    CATHETERIZATION    IMPRESSION & PLAN:  Ms. Michael Joseph is 19-year-old female    Palpitation /presyncope:  Patient has been experiencing the symptoms for last few months  No true syncopal episode. EKG with sinus bradycardia. She is not on any AV franki blocking agent. Will place event monitor to rule out tacky or bradycardia arrhythmia  Echo to rule out abnormal cardiac structure  Even though unlikely, will proceed with treadmill stress test as occasional random dyspnea along with palpitation  No chest pain or chest tightness at rest or exertion    Possible orthostatic:  I suspect the symptoms are likely because of loss of approximately 100-110 pounds of weight over within a year. Clinic orthostatic vitals were checked. Her blood pressure at supine was 110/70 mmHg and upon standing it was 98/76. In supine position heart rate was 51 bpm which increased up to 81 bpm with standing position. This could suggest orthostatic response  POTS cannot be completely excluded. Have advised patient to crease fluid intake and drink plenty of fluid at least 100 ounce a day. May consider tilt able testing depending on test finding and response    This plan was discussed with patient who is in agreement. Thank you for allowing me to participate in patient care. Please feel free to call me if you have any question or concern. Ravi Zambrano MD  Please note: This document has been produced using voice recognition software. Unrecognized errors in transcription may be present.

## 2022-08-18 NOTE — LETTER
8/18/2022    Patient: Celio Oconnor   YOB: 1980   Date of Visit: 8/18/2022     Billi Severs., MD  [id number] [facility]  [place] [address]  Via Fax: [phone]    Dear Billi Severs., MD,      Thank you for referring Ms. Julia Carrasquillo to [address] [place] [address] for evaluation. My notes for this consultation are attached. If you have questions, please do not hesitate to call me. I look forward to following your patient along with you.       Sincerely,    Megan Jordan MD

## 2022-08-22 ENCOUNTER — HOSPITAL ENCOUNTER (OUTPATIENT)
Dept: LAB | Age: 42
Discharge: HOME | End: 2022-08-22

## 2022-08-22 LAB — SENTARA SPECIMEN COL,SENBCF: NORMAL

## 2022-08-22 PROCEDURE — 99001 SPECIMEN HANDLING PT-LAB: CPT

## 2022-08-23 LAB
25(OH)D3 SERPL-MCNC: 34.5 NG/ML (ref 32–100)
A-G RATIO,AGRAT: 1.9 RATIO (ref 1.1–2.6)
ALBUMIN SERPL-MCNC: 4.2 G/DL (ref 3.5–5)
ALP SERPL-CCNC: 42 U/L (ref 25–115)
ALT SERPL-CCNC: 7 U/L (ref 5–40)
ANION GAP SERPL CALC-SCNC: 9 MMOL/L (ref 3–15)
AST SERPL W P-5'-P-CCNC: 10 U/L (ref 10–37)
BILIRUB SERPL-MCNC: 0.7 MG/DL (ref 0.2–1.2)
BUN SERPL-MCNC: 13 MG/DL (ref 6–22)
CALCIUM SERPL-MCNC: 10.2 MG/DL (ref 8.4–10.5)
CHLORIDE SERPL-SCNC: 108 MMOL/L (ref 98–110)
CO2 SERPL-SCNC: 25 MMOL/L (ref 20–32)
CREAT SERPL-MCNC: 0.5 MG/DL (ref 0.5–1.2)
FE % SATURATION,PSAT: 11 % (ref 20–50)
FERRITIN SERPL-MCNC: 72 NG/ML (ref 10–291)
FOLATE,FOL: 3 NG/ML
GLOBULIN,GLOB: 2.2 G/DL (ref 2–4)
GLOMERULAR FILTRATION RATE: >60 ML/MIN/1.73 SQ.M.
GLUCOSE SERPL-MCNC: 83 MG/DL (ref 70–99)
IRON,IRN: 32 MCG/DL (ref 30–160)
POTASSIUM SERPL-SCNC: 4.4 MMOL/L (ref 3.5–5.5)
PROT SERPL-MCNC: 6.4 G/DL (ref 6.4–8.3)
SODIUM SERPL-SCNC: 142 MMOL/L (ref 133–145)
TIBC,TIBC: 285 MCG/DL (ref 228–428)
UIBC SERPL-MCNC: 253 MCG/DL (ref 110–370)
VIT B12 SERPL-MCNC: 406 PG/ML (ref 211–911)

## 2022-08-25 LAB — VITAMIN B1, WHOLE BLOOD, 66250: 103.9 NMOL/L (ref 66.5–200)

## 2022-09-07 ENCOUNTER — TELEPHONE (OUTPATIENT)
Dept: CARDIOLOGY CLINIC | Age: 42
End: 2022-09-07

## 2022-09-13 ENCOUNTER — OFFICE VISIT (OUTPATIENT)
Dept: SURGERY | Age: 42
End: 2022-09-13
Payer: MEDICAID

## 2022-09-13 VITALS
OXYGEN SATURATION: 99 % | TEMPERATURE: 98 F | BODY MASS INDEX: 27.28 KG/M2 | HEART RATE: 60 BPM | WEIGHT: 180 LBS | SYSTOLIC BLOOD PRESSURE: 110 MMHG | HEIGHT: 68 IN | DIASTOLIC BLOOD PRESSURE: 81 MMHG

## 2022-09-13 DIAGNOSIS — R42 POSTURAL DIZZINESS WITH PRESYNCOPE: ICD-10-CM

## 2022-09-13 DIAGNOSIS — R00.2 PALPITATIONS: ICD-10-CM

## 2022-09-13 DIAGNOSIS — K91.2 POST-RESECTION MALABSORPTION: Primary | ICD-10-CM

## 2022-09-13 DIAGNOSIS — Z98.84 S/P GASTRIC BYPASS: ICD-10-CM

## 2022-09-13 DIAGNOSIS — Z86.39 HX OF OBESITY: ICD-10-CM

## 2022-09-13 DIAGNOSIS — R10.13 EPIGASTRIC PAIN: ICD-10-CM

## 2022-09-13 DIAGNOSIS — R55 POSTURAL DIZZINESS WITH PRESYNCOPE: ICD-10-CM

## 2022-09-13 DIAGNOSIS — R11.0 NAUSEA: ICD-10-CM

## 2022-09-13 DIAGNOSIS — K91.2 POST-RESECTION MALABSORPTION: ICD-10-CM

## 2022-09-13 PROCEDURE — 96372 THER/PROPH/DIAG INJ SC/IM: CPT | Performed by: NURSE PRACTITIONER

## 2022-09-13 PROCEDURE — 99214 OFFICE O/P EST MOD 30 MIN: CPT | Performed by: NURSE PRACTITIONER

## 2022-09-13 RX ORDER — THIAMINE HYDROCHLORIDE 100 MG/ML
200 INJECTION, SOLUTION INTRAMUSCULAR; INTRAVENOUS
Status: COMPLETED | OUTPATIENT
Start: 2022-09-13 | End: 2022-09-13

## 2022-09-13 RX ORDER — PANTOPRAZOLE SODIUM 40 MG/1
40 TABLET, DELAYED RELEASE ORAL DAILY
Qty: 60 TABLET | Refills: 0 | Status: SHIPPED | OUTPATIENT
Start: 2022-09-13

## 2022-09-13 RX ORDER — CYANOCOBALAMIN 1000 UG/ML
1000 INJECTION, SOLUTION INTRAMUSCULAR; SUBCUTANEOUS ONCE
Status: COMPLETED | OUTPATIENT
Start: 2022-09-13 | End: 2022-09-13

## 2022-09-13 RX ADMIN — CYANOCOBALAMIN 1000 MCG: 1000 INJECTION, SOLUTION INTRAMUSCULAR; SUBCUTANEOUS at 14:32

## 2022-09-13 RX ADMIN — THIAMINE HYDROCHLORIDE 200 MG: 100 INJECTION, SOLUTION INTRAMUSCULAR; INTRAVENOUS at 14:33

## 2022-09-13 NOTE — PROGRESS NOTES
Bariatric Postoperative Progress Note    CC: 6 Month LGBP Follow Up    Balbir Vidales is a 38 y.o. female now 6 months status post laparoscopic gastric bypass surgery performed on 3/16/22. Currently eating deli meat, beans, chicken, eggs, green beans, carrots, celery, cucumbers, lettuce. Taking in 64 oz water,  unknown g protein. 60 min of activity 2 days a week. Bowel movements are regular. She is not compliant with with recommended bariatric supplementation due to nausea. Nausea unchanged since last visit. She will wake up without nausea, but it will be triggered by eating. It will go away within a few hours and will return when she eats again. Sometimes it is tolerable, and sometimes she will not eat all day due to this. Nausea will occasionally be accompanied by pain she describes as cramping in epigastric area, this is usually a few times weekly. Zofran does help but will only resolve it for an hour or two. Experiencing palpitations and dizziness, she has been seen by cardiology and had echo and stress test completed. Currently doing an event monitor for 30 days. Denies any NSAID, ETOH, or tobacco use.    Goal weight 155  Weight Loss Metrics 9/13/2022 9/13/2022 9/8/2022 8/18/2022 6/16/2022 6/16/2022 3/31/2022   Pre op / Initial Wt 265 - - - 265 - 265   Today's Wt - 180 lb 188 lb 188 lb 3.2 oz - 212 lb -   BMI - 27.37 kg/m2 28.59 kg/m2 28.62 kg/m2 - 32.23 kg/m2 -   Ideal Body Wt 143 - - - 143 - 143   Excess Body Wt 122 - - - 122 - 122   Goal Wt 167 - - - 167 - 167   Wt loss to date 85 - - - 53 - 15   % Wt Loss 0.87 - - - 0.54 - 0.15   80% EBW 97.6 - - - 97.6 - 97.6       Comorbidities:  Hypertension: resolved  Diabetes: not applicable  Obstructive Sleep Apnea: resolved  Hyperlipidemia: not applicable  Stress Urinary Incontinence: resolved  Gastroesophageal Reflux: resolved  Weight related arthropathy:improved      Past Medical History:   Diagnosis Date    Anemia     receives iron infusions Asthma     Last attack     GERD (gastroesophageal reflux disease)     H/O gastric bypass 2022    Hypertension     Morbid obesity ([facility] [address]        Past Surgical History:   Procedure Laterality Date    HX  SECTION      HX CHOLECYSTECTOMY      HX COLONOSCOPY      HX ENDOSCOPY      HX GASTRIC BYPASS  2022       Current Outpatient Medications   Medication Sig Dispense Refill    pantoprazole (PROTONIX) 40 mg tablet Take 1 Tablet by mouth daily. 30 minutes before breakfast 60 Tablet 0    calcium citrate 200 mg (950 mg) tablet Take 200 mg by mouth daily. multivit-min/iron/folic acid/K (BARIATRIC MULTIVITAMINS PO) Take  by mouth. ondansetron (ZOFRAN ODT) 4 mg disintegrating tablet Take 1 Tablet by mouth every eight (8) hours as needed for Nausea or Vomiting. (Patient not taking: No sig reported) 12 Tablet 0       Allergies   Allergen Reactions    Iron Other (comments)     Burning per pt reports    Iron Other (comments)     Body feels like on fire    Zoloft [Sertraline] Other (comments)     Burning per pt reports       ROS:  Review of Systems   Constitutional:  Positive for malaise/fatigue and weight loss. Eyes: Negative. Respiratory:  Negative for cough and shortness of breath. Cardiovascular:  Positive for palpitations. Negative for chest pain, leg swelling and PND. Gastrointestinal:  Positive for abdominal pain and nausea. Negative for blood in stool, constipation, diarrhea, heartburn, melena and vomiting. Skin: Negative. Neurological:  Positive for dizziness and weakness. Negative for tingling, loss of consciousness and headaches. Psychiatric/Behavioral:  Positive for memory loss. Physicial Exam:  Visit Vitals  /81 (BP 1 Location: Right arm, BP Patient Position: Sitting)   Pulse 60   Temp 98 °F (36.7 °C) (Temporal)   Ht 5' 8\" (1.727 m)   Wt 81.6 kg (180 lb)   SpO2 99%   BMI 27.37 kg/m²     Physical Exam  Vitals and nursing note reviewed.    Constitutional: Appearance: Normal appearance. HENT:      Head: Normocephalic and atraumatic. Cardiovascular:      Rate and Rhythm: Normal rate. Heart sounds: Normal heart sounds. Pulmonary:      Effort: Pulmonary effort is normal.      Breath sounds: Normal breath sounds. Abdominal:      General: Bowel sounds are normal. There is no distension. Palpations: Abdomen is soft. There is no mass. Tenderness: no abdominal tenderness      Hernia: No hernia is present. Musculoskeletal:         General: Normal range of motion. Skin:     General: Skin is warm and dry. Neurological:      General: No focal deficit present. Mental Status: She is alert and oriented to person, place, and time. Psychiatric:         Mood and Affect: Mood normal.         Behavior: Behavior normal.       Labs:   Recent Results (from the past 672 hour(s))   [address] [place] [address]. Collection Time: 08/22/22  2:08 PM   Result Value Ref Range    [facility] SPECIMEN COL Specimens collected/sent to [facility]     VITAMIN B1, WHOLE BLOOD    Collection Time: 08/22/22  2:08 PM   Result Value Ref Range    VITAMIN B1, WHOLE BLOOD 103.9 66.5 - 368.4 nmol/L   METABOLIC PANEL, COMPREHENSIVE    Collection Time: 08/22/22  2:20 PM   Result Value Ref Range    Glucose 83 70 - 99 mg/dL    BUN 13 6 - 22 mg/dL    Creatinine 0.5 0.5 - 1.2 mg/dL    Sodium 142 133 - 145 mmol/L    Potassium 4.4 3.5 - 5.5 mmol/L    Chloride 108 98 - 110 mmol/L    CO2 25 20 - 32 mmol/L    AST (SGOT) 10 10 - 37 U/L    ALT (SGPT) 7 5 - 40 U/L    Alk. phosphatase 42 25 - 115 U/L    Bilirubin, total 0.7 0.2 - 1.2 mg/dL    Calcium 10.2 8.4 - 10.5 mg/dL    Protein, total 6.4 6.4 - 8.3 g/dL    Albumin 4.2 3.5 - 5.0 g/dL    A-G Ratio 1.9 1.1 - 2.6 ratio    Globulin 2.2 2.0 - 4.0 g/dL    GLOMERULAR FILTRATION RATE >60.0 >60.0 mL/min/1.73 sq.m.     Anion gap 9.0 3.0 - 15.0 mmol/L   FOLATE    Collection Time: 08/22/22  2:20 PM   Result Value Ref Range    Folate 3.00 (L) >=3.10 ng/mL   IRON PROFILE    Collection Time: 08/22/22  2:20 PM   Result Value Ref Range    Iron 32 30 - 160 mcg/dL    UIBC 253 110 - 370 mcg/dL    TIBC 285 228 - 428 mcg/dL    Iron % saturation 11 (L) 20 - 50 %   VITAMIN B12    Collection Time: 08/22/22  2:20 PM   Result Value Ref Range    Vitamin B12 406 211 - 911 pg/mL   VITAMIN D, 25 HYDROXY    Collection Time: 08/22/22  2:20 PM   Result Value Ref Range    VITAMIN D, 25-HYDROXY 34.5 32.0 - 100.0 ng/mL   FERRITIN    Collection Time: 08/22/22  2:20 PM   Result Value Ref Range    Ferritin 72 10 - 291 ng/mL   ECHO ADULT COMPLETE    Collection Time: 09/08/22  8:53 AM   Result Value Ref Range    IVSd 0.8 0.6 - 0.9 cm    LVIDd 5.4 (A) 3.9 - 5.3 cm    LVIDs 4.2 cm    LVOT Diameter 2.0 cm    LVPWd 0.9 0.6 - 0.9 cm    LVOT Peak Gradient 4 mmHg    LVOT Mean Gradient 2 mmHg    LVOT SV 72.5 ml    LVOT Peak Velocity 1.0 m/s    LVOT VTI 23.1 cm    LA Volume A/L 55 mL    LA Volume 2C 54 (A) 22 - 52 mL    LA Volume 4C 44 22 - 52 mL    MV A Velocity 0.43 m/s    MV E Wave Deceleration Time 221.2 ms    MV E Velocity 0.78 m/s    LV E' Lateral Velocity 18 cm/s    LV E' Septal Velocity 12 cm/s    TR Peak Gradient 18 mmHg    TR Max Velocity 2.10 m/s    Aortic Root 2.6 cm    Pulm Vein A Velocity 0.2 m/s    Pulm Vein A Duration 99.0 ms    Fractional Shortening 2D 22 28 - 44 %    LVIDd Index 2.71 cm/m2    LVIDs Index 2.11 cm/m2    LV RWT Ratio 0.33     LV Mass 2D 167.4 (A) 67 - 162 g    LV Mass 2D Index 84.1 43 - 95 g/m2    MV E/A 1.81     E/E' Ratio (Averaged) 5.42     E/E' Lateral 4.33     E/E' Septal 6.50     LA Volume Index A/L 28 16 - 34 mL/m2    LVOT Stroke Volume Index 36.4 mL/m2    LVOT Area 3.1 cm2    LA Volume Index 2C 27 16 - 34 mL/m2    LA Volume Index 4C 22 16 - 34 mL/m2    Ao Root Index 1.31 cm/m2    Est. RA Pressure 3 mmHg    RVSP 21 mmHg   EXERCISE CARDIAC STRESS TEST    Collection Time: 09/08/22 10:04 AM   Result Value Ref Range    Stress Target  bpm    Exercise Duration Time 7 min Exercuse Duration Seconds 1 sec    Stress Systolic  mmHg    Stress Diastolic BP 78 mmHg    Stress Peak  BPM    Baseline HR 61 BPM    Stress Estimated Workload 10.1 METS    Stress Rate Pressure Product 24,992 BPM*mmHg    Stress Percent HR Achieved 98 %    Baseline Systolic  mmHg    Baseline Diastolic BP 80 mmHg    Stress Stage 1 Duration 3 min:sec    Stress Stage 1  bpm    Stress Stage 1 /80 mmHg    Stress Stage 2 Duration 3 min:sec    Stress Stage 2  bpm    Stress Stage 2 /78 mmHg    Stress Stage 3 Duration 1 min:sec    Stress Stage 3  bpm    Recovery Stage 1 Duration 2 min:sec    Recovery Stage 1 HR 75 bpm    Recovery Stage 1 /70 mmHg    Recovery Stage 2 Duration 4 min:sec    Recovery Stage 2 HR 68 bpm    Recovery Stage 2 /72 mmHg    Angina Index 0     Santamaria Treadmill Score 7     Baseline ST Depression 0 mm    Stress ST Depression 0 mm       Assessment/Plan:   She is currently 6 months s/p laparoscopic gastric bypass surgery with a total weight loss of 85 lbs to date, struggling with nausea. Labs were reviewed and pt was instructed to restart recommended bariatric supplementation. Do not open MVI capsule, swallow whole. Thiamine and B12 injections given in office. Will start pantoprazole 40 mg daily to assist with nausea and epigastric pain. Also recommend taking Zofran before meals instead of after. Discussed to start a log with what she ate and symptoms she experienced to determine if she is experiencing dumping vs food intolerance vs reactive hypoglycemia. We have reviewed the components of a successful postoperative course including requirement for a high protein, low carbohydrate diet, 64 oz a day of zero calorie liquids, daily vitamin supplementation, daily exercise (150 mis/week), regular follow-up, and participation in support groups. Refer to registered dietitian for more detailed medical nutrition therapy as needed.      The primary encounter diagnosis was Post-resection malabsorption. Diagnoses of S/P gastric bypass, Hx of obesity, BMI 27.0-27.9,adult, Nausea, Epigastric pain, Postural dizziness with presyncope, and Palpitations were also pertinent to this visit. Follow-up and Dispositions    Return in about 4 weeks (around 10/11/2022) for Symptom Follow Up.     sooner as needed.   Kacey Rodriguez NP

## 2022-09-16 ENCOUNTER — TELEPHONE (OUTPATIENT)
Dept: CARDIOLOGY CLINIC | Age: 42
End: 2022-09-16

## 2022-09-16 NOTE — TELEPHONE ENCOUNTER
----- Message from Eloise Silverman MD sent at 9/10/2022 11:44 AM EDT -----  Please inform patient about test result  Appears normal.    Thanks  SP

## 2022-10-04 ENCOUNTER — OFFICE VISIT (OUTPATIENT)
Dept: CARDIOLOGY CLINIC | Age: 42
End: 2022-10-04
Payer: MEDICAID

## 2022-10-04 VITALS
WEIGHT: 175 LBS | BODY MASS INDEX: 26.61 KG/M2 | DIASTOLIC BLOOD PRESSURE: 71 MMHG | OXYGEN SATURATION: 99 % | TEMPERATURE: 98.4 F | HEART RATE: 64 BPM | SYSTOLIC BLOOD PRESSURE: 105 MMHG

## 2022-10-04 DIAGNOSIS — R00.2 PALPITATIONS: Primary | ICD-10-CM

## 2022-10-04 DIAGNOSIS — R55 POSTURAL DIZZINESS WITH PRESYNCOPE: ICD-10-CM

## 2022-10-04 DIAGNOSIS — R42 POSTURAL DIZZINESS WITH PRESYNCOPE: ICD-10-CM

## 2022-10-04 PROCEDURE — 99213 OFFICE O/P EST LOW 20 MIN: CPT | Performed by: INTERNAL MEDICINE

## 2022-10-04 NOTE — PROGRESS NOTES
Identified pt with two pt identifiers(name and ). Reviewed record in preparation for visit and have obtained necessary documentation. Larisa Partida presents today for   Chief Complaint   Patient presents with    Cardiac Testing     Treadmill and echo     Surgical Clearance     Hysterectomy        Pt c/o DIZZINESS, CHEST PAIN/ PRESSURE. Larisa Partida preferred language for health care discussion is english/other. Personal Protective Equipment:   Personal Protective Equipment was used including: mask-surgical and hands-gloves. Patient was placed on no precaution(s). Patient was masked. Precautions:   Patient currently on None  Patient currently roomed with door closed. Is someone accompanying this pt? no    Is the patient using any DME equipment during [address]? no    Depression Screening:  3 most recent PHQ Screens 10/4/2022   Little interest or pleasure in doing things Not at all   Feeling down, depressed, irritable, or hopeless Not at all   Total Score PHQ 2 0       Learning Assessment:  Learning Assessment 2021   PRIMARY LEARNER Patient   PRIMARY LANGUAGE ENGLISH   LEARNER PREFERENCE PRIMARY VIDEOS   ANSWERED BY patient   RELATIONSHIP SELF       Abuse Screening:  Abuse Screening Questionnaire 2022   Do you ever feel afraid of your partner? N   Are you in a relationship with someone who physically or mentally threatens you? N   Is it safe for you to go home? Y       Fall Risk  completed    Pt currently taking Anticoagulant therapy? no  Pt currently taking Antiplatelet therapy? no    Coordination of Care:  1. Have you been to the ER, urgent care clinic since your last visit? Hospitalized since your last visit? no    2. Have you seen or consulted any other health care providers outside of the [address] [place] [address] since your last visit? Include any pap smears or colon screening. GYN      Please see Red banners under Allergies and Med Rec to remove outside inquires.  All correct information has been verified with patient and added to chart.      Medication's patient's would liked removed has been marked not taking to be removed per Verbal order and read back per Liza Moss MD

## 2022-10-04 NOTE — LETTER
10/4/2022    Patient: Benja Fay   YOB: 1980   Date of Visit: 10/4/2022     Paula Morales MD  [address] [facility],[address]  [place] [address] [address]  Via Fax: [phone]    Dear Paula Morales MD,      Thank you for referring Ms. Kylah Burgos to [address] [place] [address] for evaluation. My notes for this consultation are attached. If you have questions, please do not hesitate to call me. I look forward to following your patient along with you.       Sincerely,    Estella Bennett MD

## 2022-10-04 NOTE — PROGRESS NOTES
[facility]    Ms. Trish Ocampo is 38 y.o. female with a history of morbid obesity status post gastric bypass surgery, hypertension, asthma    Patient is here today for follow-up appointment. .  She denies any prior history of MI or CHF. Patient has undergone echocardiogram and stress test since last visit. She is still wearing event monitor. Patient had gastric bypass surgery in 03/2022 for morbid obesity with maximum weight of 310 pounds. Since then she has lost significant amount of weight and now she weighs about 175 pounds. Patient has longstanding history of palpitation especially after standing from sitting position quickly causing some dizziness and then rapid heartbeat. Does not have any chest pain or chest tightness at this time however she continues to have some palpitation and dizziness especially with positional change. Denies presyncope or syncope  Denies any nausea, vomiting, abdominal pain, fever, chills, sputum production. No hematuria or other bleeding complaints    Past Medical History:   Diagnosis Date    Anemia     receives iron infusions    Asthma     Last attack 2017    GERD (gastroesophageal reflux disease)     H/O gastric bypass 03/2022    Hypertension     Morbid obesity ([facility] [address]        Review of Systems:  Cardiac symptoms as noted above in HPI. All others negative. Denies fatigue, malaise, skin rash, joint pain, blurring vision, photophobia, neck pain, hemoptysis, chronic cough, nausea, vomiting, hematuria, burning micturition, BRBPR, chronic headaches. Current Outpatient Medications   Medication Sig    pantoprazole (PROTONIX) 40 mg tablet Take 1 Tablet by mouth daily. 30 minutes before breakfast    calcium citrate 200 mg (950 mg) tablet Take 200 mg by mouth daily. multivit-min/iron/folic acid/K (BARIATRIC MULTIVITAMINS PO) Take  by mouth.     ondansetron (ZOFRAN ODT) 4 mg disintegrating tablet Take 1 Tablet by mouth every eight (8) hours as needed for Nausea or Vomiting. (Patient not taking: No sig reported)     No current facility-administered medications for this visit. Past Surgical History:   Procedure Laterality Date    HX  SECTION      HX CHOLECYSTECTOMY      HX COLONOSCOPY      HX ENDOSCOPY      HX GASTRIC BYPASS  2022       Allergies and Sensitivities:  Allergies   Allergen Reactions    Iron Other (comments)     Burning per pt reports    Iron Other (comments)     Body feels like on fire    Zoloft [Sertraline] Other (comments)     Burning per pt reports       Family History:  Family History   Problem Relation Age of Onset    Stroke Mother     Obesity Father     Cancer Father         cancer  throat    Cancer Maternal Grandmother     Breast Cancer Maternal Grandmother         63's       Social History:  Social History     Tobacco Use    Smoking status: Former     Types: Cigarettes     Quit date: 10/11/2000     Years since quittin.9    Smokeless tobacco: Never   Vaping Use    Vaping Use: Never used   Substance Use Topics    Alcohol use: Not Currently     Comment: Rare    Drug use: Never     She  reports that she quit smoking about 21 years ago. Her smoking use included cigarettes. She has never used smokeless tobacco.  She  reports that she does not currently use alcohol. Physical Exam:  BP Readings from Last 3 Encounters:   22 110/81   22 122/80   22 122/80         Pulse Readings from Last 3 Encounters:   22 60   22 (!) 55   22 68          Wt Readings from Last 3 Encounters:   22 81.6 kg (180 lb)   22 85.3 kg (188 lb)   22 85.4 kg (188 lb 3.2 oz)       Constitutional: Oriented to person, place, and time. HENT: Head: Normocephalic and atraumatic. Neck: No JVD present. Cardiovascular: Regular rhythm. No murmur, gallop or rubs appreciated  Lung: Breath sounds normal. No respiratory distress.  No ronchi or rales appreciated  Abdominal: No tenderness. No rebound and no guarding. Musculoskeletal: There is no lower extremity edema. No cynosis    LABS:   @  Lab Results   Component Value Date/Time    WBC 4.7 2022 01:15 PM    HGB 11.4 (L) 2022 01:15 PM    HCT 35.6 2022 01:15 PM    PLATELET 788  01:15 PM    MCV 87 2022 01:15 PM     Lab Results   Component Value Date/Time    Sodium 142 2022 02:20 PM    Potassium 4.4 2022 02:20 PM    Chloride 108 2022 02:20 PM    CO2 25 2022 02:20 PM    Glucose 83 2022 02:20 PM    BUN 13 2022 02:20 PM    Creatinine 0.5 2022 02:20 PM     Lipids Latest Ref Rng & Units 2022   Chol, Total 110 - 200 mg/dL 141   HDL >=40 mg/dL 31(L)   LDL 50 - 99 mg/dL 84   Trig 40 - 149 mg/dL 131   Some recent data might be hidden     Lab Results   Component Value Date/Time    ALT (SGPT) 7 2022 02:20 PM     No results found for: HBA1C, JVT0JDMR, OGN0WHMV, PMN2XBJW  Lab Results   Component Value Date/Time    TSH 1.50 2022 09:51 AM     EK2022: Sinus rhythm at 55 bpm.  Sinus bradycardia. Normal MD and QRS interval.  No ST changes of ischemia    STRESS TEST (2022)    ECG: Resting ECG demonstrates normal sinus rhythm. Stress Test: A Robert protocol stress test was performed. Patient exercised for 7 minutes and 1 second achieving 10.1 METS. Resting heart rate of 61 bpm increased to 176 bpm which is 97% of maximal age predicted heart rate. No chest pain. No EKG changes. Patient had short run of atrial tachycardia and rare PACs. Impression: Low risk exercise stress test.    ECHO ADULT COMPLETE 2022  Interpretation Summary    Left Ventricle: Normal left ventricular systolic function with a visually estimated EF of 60 - 65%. Left ventricle size is normal. Normal wall thickness. Normal wall motion. Normal diastolic function.     IMPRESSION & PLAN:  Ms. Daisy Ferraro is 38 y.o. female    Palpitation /presyncope:  No presyncope or syncope  Ongoing of palpitation  Patient is currently wearing event monitor. She is due to return to monitor in couple days. We will review monitor when available. Echo in 09/2022 with normal EF and no major valvular heart disease. Stress test, low risk in 07/2022  Advised patient to keep herself well-hydrated and also consider using electrolyte solution for hydration. Possible orthostatic:  I suspect the symptoms are likely because of loss of approximately 120 pounds of weight over within a year. Continue aggressive oral hydration    Gastric bypass: Max weight 310 pounds according to patient. Stress was gastric bypass surgery and now weight is 175 pounds    Patient is scheduled to have hysterectomy surgery. Patient stress test and echocardiogram has been unremarkable. Currently does not have any symptoms concerning for unstable angina or coronary disease or any decompensated heart failure. No evidence of fluid overload  Assuming event monitor has no evidence of significant arrhythmia, she likely will be acceptable candidate to proceed with surgery. We will make final recommendation based on event monitor finding. This plan was discussed with patient who is in agreement. Thank you for allowing me to participate in patient care. Please feel free to call me if you have any question or concern. Gus Hylton MD  Please note: This document has been produced using voice recognition software. Unrecognized errors in transcription may be present.

## 2022-10-13 ENCOUNTER — TELEPHONE (OUTPATIENT)
Dept: CARDIOLOGY CLINIC | Age: 42
End: 2022-10-13

## 2022-10-13 ENCOUNTER — OFFICE VISIT (OUTPATIENT)
Dept: SURGERY | Age: 42
End: 2022-10-13
Payer: MEDICAID

## 2022-10-13 VITALS
OXYGEN SATURATION: 99 % | WEIGHT: 172 LBS | DIASTOLIC BLOOD PRESSURE: 76 MMHG | SYSTOLIC BLOOD PRESSURE: 106 MMHG | BODY MASS INDEX: 26.07 KG/M2 | HEART RATE: 62 BPM | HEIGHT: 68 IN | TEMPERATURE: 98 F

## 2022-10-13 DIAGNOSIS — Z86.39 HX OF OBESITY: ICD-10-CM

## 2022-10-13 DIAGNOSIS — Z98.84 S/P GASTRIC BYPASS: ICD-10-CM

## 2022-10-13 DIAGNOSIS — R42 POSTURAL DIZZINESS WITH PRESYNCOPE: ICD-10-CM

## 2022-10-13 DIAGNOSIS — R11.0 NAUSEA: ICD-10-CM

## 2022-10-13 DIAGNOSIS — K91.2 POST-RESECTION MALABSORPTION: Primary | ICD-10-CM

## 2022-10-13 DIAGNOSIS — R55 POSTURAL DIZZINESS WITH PRESYNCOPE: ICD-10-CM

## 2022-10-13 DIAGNOSIS — R10.13 EPIGASTRIC PAIN: ICD-10-CM

## 2022-10-13 PROCEDURE — 99213 OFFICE O/P EST LOW 20 MIN: CPT | Performed by: NURSE PRACTITIONER

## 2022-10-13 NOTE — LETTER
10/13/2022 3:05 PM    Ms. Cady Gao  [address] [place] [address] Светлана Garcia was seen in our office on 10/13/2022 for cardiac evaluation    From a cardiac standpoint she is at low to intermediate risk to have her hysterectomy. Please feel free to contact our office if you have any questions regarding this patient.            Sincerely,      Merlin Frees, MD

## 2022-10-13 NOTE — TELEPHONE ENCOUNTER
Contacted pt at home number. Two patient Identifiers confirmed. Advised pt per  that her biotel was normal PT had some fast heart rates but were all sinus. PT is cleared for Sx. PT verbally understanding.  Clearance for Sx faxed

## 2022-10-13 NOTE — PROGRESS NOTES
Bariatric Postoperative Progress Note    CC: Symptom Follow Up    Wander Zambrano is a 38 y.o. female now 7 months status post laparoscopic gastric bypass surgery performed on 3/16/22. Currently eating chicken, eggs, lunch meat, blueberries, strawberries, green beans, peas, celery, salad, carrots, occ crackers. Taking in 64 oz water,  unknown g protein. 30-60 min of activity 5 days a week. Bowel movements are regular. She is not compliant with multivitamins, calcium, Vit D and B12 supplements, still not taking daily. Started pantoprazole with minimal improvement in nausea. Reports that nausea usually occurs around 2-3 in afternoon. Experiencing epigastric cramping around 3 days weekly. Denies high density carbohydrates, but will occasionally have a few crackers when she is experiencing nausea. Being evaluated by cardiology for postural dizziness and palpitations. Scheduled for lap hysterectomy on 10/28. Weight Loss Metrics 10/13/2022 10/13/2022 10/4/2022 2022 2022 2022 2022   Pre op / Initial Wt 265 - - 265 - - -   Today's Wt - 172 lb 175 lb - 180 lb 188 lb 188 lb 3.2 oz   BMI - 26.15 kg/m2 26.61 kg/m2 - 27.37 kg/m2 28.59 kg/m2 28.62 kg/m2   Ideal Body Wt 143 - - 143 - - -   Excess Body Wt 122 - - 122 - - -   Goal Wt 167 - - 167 - - -   Wt loss to date 93 - - 85 - - -   % Wt Loss 0.95 - - 0.87 - - -   80% EBW 97.6 - - 97.6 - - -         Past Medical History:   Diagnosis Date    Anemia     receives iron infusions    Asthma     Last attack     GERD (gastroesophageal reflux disease)     H/O gastric bypass 2022    Hypertension     Morbid obesity (HCC)        Past Surgical History:   Procedure Laterality Date    HX  SECTION      HX CHOLECYSTECTOMY      HX COLONOSCOPY      HX ENDOSCOPY      HX GASTRIC BYPASS  2022       Current Outpatient Medications   Medication Sig Dispense Refill    pantoprazole (PROTONIX) 40 mg tablet Take 1 Tablet by mouth daily.  30 minutes before breakfast 60 Tablet 0    calcium citrate 200 mg (950 mg) tablet Take 200 mg by mouth daily. (Patient not taking: Reported on 10/13/2022)      multivit-min/iron/folic acid/K (BARIATRIC MULTIVITAMINS PO) Take  by mouth. (Patient not taking: Reported on 10/13/2022)      ondansetron (ZOFRAN ODT) 4 mg disintegrating tablet Take 1 Tablet by mouth every eight (8) hours as needed for Nausea or Vomiting. (Patient not taking: No sig reported) 12 Tablet 0       Allergies   Allergen Reactions    Iron Other (comments)     Burning per pt reports    Iron Other (comments)     Body feels like on fire    Zoloft [Sertraline] Other (comments)     Burning per pt reports       ROS:  Review of Systems   Constitutional:  Positive for malaise/fatigue and weight loss. Respiratory: Negative. Cardiovascular:  Positive for palpitations. Negative for chest pain. Gastrointestinal:  Positive for abdominal pain and nausea. Negative for constipation, diarrhea, heartburn and vomiting. Neurological:  Positive for dizziness. Negative for tingling and loss of consciousness. Physicial Exam:  Visit Vitals  /76 (BP 1 Location: Right arm, BP Patient Position: Sitting)   Pulse 62   Temp 98 °F (36.7 °C) (Temporal)   Ht 5' 8\" (1.727 m)   Wt 78 kg (172 lb)   SpO2 99%   BMI 26.15 kg/m²     Physical Exam  Vitals and nursing note reviewed. Constitutional:       Appearance: Normal appearance. HENT:      Head: Normocephalic and atraumatic. Cardiovascular:      Rate and Rhythm: Normal rate. Pulmonary:      Effort: Pulmonary effort is normal.   Musculoskeletal:         General: Normal range of motion. Neurological:      General: No focal deficit present. Mental Status: She is alert and oriented to person, place, and time. Psychiatric:         Mood and Affect: Mood normal.         Behavior: Behavior normal.       Labs:   No results found for this or any previous visit (from the past 672 hour(s)).     Assessment/Plan: -Importance of taking recommended daily bariatric supplementation discussed. She will start taking in evenings before bed  -Nausea most likely hypoglycemia since it is around the same time daily as well as BMP drawn at 1400 on 10/7 labs in [address] [place] [address] showing glucose of 55. Discussed to eat regularly focusing on protein as well as avoiding high density carbohydrates. Continue to use Zofran prn.  -D/c pantoprazole since she experienced no improvement  -Will order UGI to rule out any functional issue causing nausea and epigastric pain.   -Proceed with follow ups with cardiology. Discussed increasing fluid intake as well as incorporating electrolytes (such as SF electrolyte drink or salt water) to assist with postural dizziness.   -Importance of vitamins as well meeting protein goals discussed for upcoming surgery and recovery. Refer to registered dietitian for more detailed medical nutrition therapy as needed. The primary encounter diagnosis was Post-resection malabsorption. Diagnoses of S/P gastric bypass, Hx of obesity, BMI 26.0-26.9,adult, Nausea, Epigastric pain, and Postural dizziness with presyncope were also pertinent to this visit. Follow-up and Dispositions    Return in about 7 weeks (around 12/1/2022). sooner as needed.   Alka Pressley NP

## 2022-10-13 NOTE — TELEPHONE ENCOUNTER
Please call patient as well because she would like to go over her heart monitor results after mariangel has reviewed them

## 2022-10-13 NOTE — TELEPHONE ENCOUNTER
Please pull bio tel monitor report and have provider review. Patient is scheduled for surgery on 10/28.  Please fax clearance letter        U;[phone]  P: [phone]

## 2022-10-20 DIAGNOSIS — R00.2 PALPITATIONS: ICD-10-CM

## 2022-10-20 DIAGNOSIS — R55 POSTURAL DIZZINESS WITH PRESYNCOPE: ICD-10-CM

## 2022-10-20 DIAGNOSIS — R06.09 DOE (DYSPNEA ON EXERTION): ICD-10-CM

## 2022-10-20 DIAGNOSIS — I10 ESSENTIAL HYPERTENSION: ICD-10-CM

## 2022-10-20 DIAGNOSIS — R42 POSTURAL DIZZINESS WITH PRESYNCOPE: ICD-10-CM

## 2022-10-27 ENCOUNTER — HOSPITAL ENCOUNTER (OUTPATIENT)
Dept: GENERAL RADIOLOGY | Age: 42
Discharge: HOME | End: 2022-10-27
Attending: NURSE PRACTITIONER
Payer: MEDICAID

## 2022-10-27 DIAGNOSIS — R11.0 NAUSEA: ICD-10-CM

## 2022-10-27 DIAGNOSIS — R10.13 EPIGASTRIC PAIN: ICD-10-CM

## 2022-10-27 DIAGNOSIS — Z86.39 HX OF OBESITY: ICD-10-CM

## 2022-10-27 DIAGNOSIS — R55 POSTURAL DIZZINESS WITH PRESYNCOPE: ICD-10-CM

## 2022-10-27 DIAGNOSIS — Z98.84 S/P GASTRIC BYPASS: ICD-10-CM

## 2022-10-27 DIAGNOSIS — R42 POSTURAL DIZZINESS WITH PRESYNCOPE: ICD-10-CM

## 2022-10-27 DIAGNOSIS — K91.2 POST-RESECTION MALABSORPTION: ICD-10-CM

## 2022-10-27 PROCEDURE — 74011000250 HC RX REV CODE- 250: Performed by: NURSE PRACTITIONER

## 2022-10-27 PROCEDURE — 74240 X-RAY XM UPR GI TRC 1CNTRST: CPT

## 2022-10-27 RX ADMIN — BARIUM SULFATE 700 MG: 700 TABLET ORAL at 08:46

## 2022-10-27 RX ADMIN — BARIUM SULFATE 176 G: 960 POWDER, FOR SUSPENSION ORAL at 08:45

## 2022-11-29 ENCOUNTER — HOSPITAL ENCOUNTER (OUTPATIENT)
Dept: LAB | Age: 42
Discharge: HOME | End: 2022-11-29

## 2022-11-29 LAB — SENTARA SPECIMEN COL,SENBCF: NORMAL

## 2022-11-29 PROCEDURE — 99001 SPECIMEN HANDLING PT-LAB: CPT

## 2022-11-30 LAB
25(OH)D3 SERPL-MCNC: 31.6 NG/ML (ref 32–100)
A-G RATIO,AGRAT: 2.1 RATIO (ref 1.1–2.6)
ALBUMIN SERPL-MCNC: 4 G/DL (ref 3.5–5)
ALP SERPL-CCNC: 54 U/L (ref 25–115)
ALT SERPL-CCNC: 10 U/L (ref 5–40)
ANION GAP SERPL CALC-SCNC: 8 MMOL/L (ref 3–15)
AST SERPL W P-5'-P-CCNC: 9 U/L (ref 10–37)
BILIRUB SERPL-MCNC: 0.8 MG/DL (ref 0.2–1.2)
BUN SERPL-MCNC: 12 MG/DL (ref 6–22)
CALCIUM SERPL-MCNC: 9.2 MG/DL (ref 8.4–10.5)
CHLORIDE SERPL-SCNC: 107 MMOL/L (ref 98–110)
CO2 SERPL-SCNC: 28 MMOL/L (ref 20–32)
CREAT SERPL-MCNC: 0.6 MG/DL (ref 0.5–1.2)
ERYTHROCYTE [DISTWIDTH] IN BLOOD BY AUTOMATED COUNT: 14.3 % (ref 10–15.5)
FERRITIN SERPL-MCNC: 150 NG/ML (ref 10–291)
FOLATE,FOL: 3.3 NG/ML
GLOBULIN,GLOB: 1.9 G/DL (ref 2–4)
GLOMERULAR FILTRATION RATE: >60 ML/MIN/1.73 SQ.M.
GLUCOSE SERPL-MCNC: 80 MG/DL (ref 70–99)
HCT VFR BLD AUTO: 39.8 % (ref 35.1–46.5)
HGB BLD-MCNC: 12.5 G/DL (ref 11.7–15.5)
IMMATURE PLATELET FRACTION: 10.6 % (ref 1.1–7.1)
IRON,IRN: 38 MCG/DL (ref 30–160)
MCH RBC QN AUTO: 28 PG (ref 26–34)
MCHC RBC AUTO-ENTMCNC: 31 G/DL (ref 31–36)
MCV RBC AUTO: 88 FL (ref 80–99)
PLATELET # BLD AUTO: 161 K/UL (ref 140–440)
PMV BLD AUTO: 13.1 FL (ref 9–13)
POTASSIUM SERPL-SCNC: 4.4 MMOL/L (ref 3.5–5.5)
PROT SERPL-MCNC: 5.9 G/DL (ref 6.4–8.3)
RBC # BLD AUTO: 4.5 M/UL (ref 3.8–5.2)
SODIUM SERPL-SCNC: 143 MMOL/L (ref 133–145)
VIT B12 SERPL-MCNC: 318 PG/ML (ref 211–911)
WBC # BLD AUTO: 5.1 K/UL (ref 4–11)

## 2022-12-01 ENCOUNTER — OFFICE VISIT (OUTPATIENT)
Dept: SURGERY | Age: 42
End: 2022-12-01
Payer: MEDICAID

## 2022-12-01 VITALS
HEIGHT: 68 IN | OXYGEN SATURATION: 100 % | DIASTOLIC BLOOD PRESSURE: 70 MMHG | BODY MASS INDEX: 24.4 KG/M2 | SYSTOLIC BLOOD PRESSURE: 99 MMHG | WEIGHT: 161 LBS | HEART RATE: 62 BPM | TEMPERATURE: 98 F

## 2022-12-01 DIAGNOSIS — R42 POSTURAL DIZZINESS WITH PRESYNCOPE: ICD-10-CM

## 2022-12-01 DIAGNOSIS — Z98.84 S/P GASTRIC BYPASS: ICD-10-CM

## 2022-12-01 DIAGNOSIS — R55 POSTURAL DIZZINESS WITH PRESYNCOPE: ICD-10-CM

## 2022-12-01 DIAGNOSIS — Z86.39 HX OF OBESITY: ICD-10-CM

## 2022-12-01 DIAGNOSIS — K91.2 POST-RESECTION MALABSORPTION: Primary | ICD-10-CM

## 2022-12-01 DIAGNOSIS — R11.0 NAUSEA: ICD-10-CM

## 2022-12-01 PROCEDURE — 3074F SYST BP LT 130 MM HG: CPT | Performed by: NURSE PRACTITIONER

## 2022-12-01 PROCEDURE — 99213 OFFICE O/P EST LOW 20 MIN: CPT | Performed by: NURSE PRACTITIONER

## 2022-12-01 PROCEDURE — 3078F DIAST BP <80 MM HG: CPT | Performed by: NURSE PRACTITIONER

## 2022-12-01 RX ORDER — THIAMINE HYDROCHLORIDE 100 MG/ML
200 INJECTION, SOLUTION INTRAMUSCULAR; INTRAVENOUS
Status: COMPLETED | OUTPATIENT
Start: 2022-12-01 | End: 2022-12-01

## 2022-12-01 RX ADMIN — THIAMINE HYDROCHLORIDE 200 MG: 100 INJECTION, SOLUTION INTRAMUSCULAR; INTRAVENOUS at 11:51

## 2022-12-01 NOTE — PROGRESS NOTES
Bariatric Postoperative Progress Note    CC: Nausea/Symptom Follow Up    Larisa Partida is a 38 y.o. female now 8 months status post laparoscopic gastric bypass surgery performed on 3/16/22. Currently eating turkey, chicken, eggs, peanut butter, blueberries, green beans, carrots, celery. Taking in 64 oz water,  unknown g protein. 60 min of activity 1 days a week. Bowel movements are alternating constipation and regularity. She is still not compliant with multivitamins, calcium, Vit D and B12 supplements, reports she knows she should take them but is not. Nausea slightly improved, but still experiencing almost daily. Laparoscopic hysterectomy 10/28/2022, reports she is recovering well from this. Cleared by cardiology for palpitations and postural dizziness. Goal weight 150    Weight Loss Metrics 2022 2022 10/13/2022 10/13/2022 10/4/2022 2022 2022   Pre op / Initial Wt 265 - 265 - - 265 -   Today's Wt - 161 lb - 172 lb 175 lb - 180 lb   BMI - 24.48 kg/m2 - 26.15 kg/m2 26.61 kg/m2 - 27.37 kg/m2   Ideal Body Wt 143 - 143 - - 143 -   Excess Body Wt 122 - 122 - - 122 -   Goal Wt 167 - 167 - - 167 -   Wt loss to date 104 - 93 - - 85 -   % Wt Loss 1.07 - 0.95 - - 0.87 -   80% EBW 97.6 - 97.6 - - 97.6 -         Past Medical History:   Diagnosis Date    Anemia     receives iron infusions    Asthma     Last attack     GERD (gastroesophageal reflux disease)     H/O gastric bypass 2022    Hypertension     Morbid obesity (HCC)        Past Surgical History:   Procedure Laterality Date    HX  SECTION      HX CHOLECYSTECTOMY      HX COLONOSCOPY      HX ENDOSCOPY      HX GASTRIC BYPASS  2022    HX HYSTERECTOMY  10/2022       Current Outpatient Medications   Medication Sig Dispense Refill    calcium citrate 200 mg (950 mg) tablet Take 200 mg by mouth daily. (Patient not taking: No sig reported)      multivit-min/iron/folic acid/K (BARIATRIC MULTIVITAMINS PO) Take  by mouth. (Patient not taking: No sig reported)         Allergies   Allergen Reactions    Iron Other (comments)     Burning per pt reports    Iron Other (comments)     Body feels like on fire    Zoloft [Sertraline] Other (comments)     Burning per pt reports       ROS:  Review of Systems   Constitutional:  Positive for malaise/fatigue and weight loss. Respiratory:  Negative for cough and shortness of breath. Cardiovascular:  Positive for palpitations. Negative for chest pain and orthopnea. Gastrointestinal:  Positive for nausea. Negative for abdominal pain, constipation, diarrhea, heartburn and vomiting. Neurological:  Positive for dizziness. Negative for tingling, loss of consciousness, weakness and headaches. Physicial Exam:  Visit Vitals  BP 99/70 (BP 1 Location: Right arm, BP Patient Position: Sitting)   Pulse 62   Temp 98 °F (36.7 °C) (Temporal)   Ht 5' 8\" (1.727 m)   Wt 73 kg (161 lb)   LMP 02/01/2022   SpO2 100%   BMI 24.48 kg/m²     Physical Exam  Vitals and nursing note reviewed. Constitutional:       Appearance: Normal appearance. HENT:      Head: Normocephalic and atraumatic. Pulmonary:      Effort: Pulmonary effort is normal.   Musculoskeletal:         General: Normal range of motion. Neurological:      General: No focal deficit present. Mental Status: She is alert and oriented to person, place, and time. Psychiatric:         Mood and Affect: Mood normal.         Behavior: Behavior normal.       Labs:   Recent Results (from the past 672 hour(s))   [address] [place] [address].     Collection Time: 11/29/22 11:17 AM   Result Value Ref Range    [facility] SPECIMEN COL Specimens collected/sent to [facility]     METABOLIC PANEL, COMPREHENSIVE    Collection Time: 11/29/22 11:20 AM   Result Value Ref Range    Glucose 80 70 - 99 mg/dL    BUN 12 6 - 22 mg/dL    Creatinine 0.6 0.5 - 1.2 mg/dL    Sodium 143 133 - 145 mmol/L    Potassium 4.4 3.5 - 5.5 mmol/L    Chloride 107 98 - 110 mmol/L    CO2 28 20 - 32 mmol/L    AST (SGOT) 9 (L) 10 - 37 U/L    ALT (SGPT) 10 5 - 40 U/L    Alk. phosphatase 54 25 - 115 U/L    Bilirubin, total 0.8 0.2 - 1.2 mg/dL    Calcium 9.2 8.4 - 10.5 mg/dL    Protein, total 5.9 (L) 6.4 - 8.3 g/dL    Albumin 4.0 3.5 - 5.0 g/dL    A-G Ratio 2.1 1.1 - 2.6 ratio    Globulin 1.9 (L) 2.0 - 4.0 g/dL    GLOMERULAR FILTRATION RATE >60.0 >60.0 mL/min/1.73 sq.m. Anion gap 8.0 3.0 - 15.0 mmol/L   FOLATE    Collection Time: 11/29/22 11:20 AM   Result Value Ref Range    Folate 3.30 >=3.10 ng/mL   IRON    Collection Time: 11/29/22 11:20 AM   Result Value Ref Range    Iron 38 30 - 160 mcg/dL   VITAMIN B12    Collection Time: 11/29/22 11:20 AM   Result Value Ref Range    Vitamin B12 318 211 - 911 pg/mL   VITAMIN D, 25 HYDROXY    Collection Time: 11/29/22 11:20 AM   Result Value Ref Range    VITAMIN D, 25-HYDROXY 31.6 (L) 32.0 - 100.0 ng/mL   FERRITIN    Collection Time: 11/29/22 11:20 AM   Result Value Ref Range    Ferritin 150 10 - 291 ng/mL   CBC W/O DIFF    Collection Time: 11/29/22 11:20 AM   Result Value Ref Range    WBC 5.1 4.0 - 11.0 K/uL    RBC 4.50 3.80 - 5.20 M/uL    HGB 12.5 11.7 - 15.5 g/dL    HCT 39.8 35.1 - 46.5 %    MCV 88 80 - 99 fL    MCH 28 26 - 34 pg    MCHC 31 31 - 36 g/dL    RDW 14.3 10.0 - 15.5 %    PLATELET 990 508 - 927 K/uL    MPV 13.1 (H) 9.0 - 13.0 fL    IMMATURE PLATELET FRACTION 18.3 (H) 1.1 - 7.1 %   VITAMIN B1, WHOLE BLOOD    Collection Time: 11/29/22 11:20 AM   Result Value Ref Range    VITAMIN B1, WHOLE BLOOD 90.8 66.5 - 200.0 nmol/L       Assessment/Plan:   She is currently 8 months s/p laparoscopic gastric bypass surgery with a total weight loss of 104 lbs to date, struggling with recommended bariatric supplementation and nausea. Labs were reviewed, B1 not resulted at time of visit and thiamine injection given in office due to noncompliance with vitamin supplementation and ongoing nausea.  Importance of taking recommended bariatric vitamin supplementation to prevent vitamin deficiencies yet again stressed. Samples of ProCare Bariatric MVI given to pt. Vitamins B12 and D trending down. Iron and CBC improving after hysterectomy. Stressed importance of drinking sugar free fluids with electrolytes to prevent dizziness and orthostatic hypotension. She is drinking some purchased santa with added electrolytes, discussed to incorporate SF electrolyte beverages or salt water. She reports occasionally not eating breakfast, discussed importance of eating within one hour of getting up to ensure stable blood sugars as well as to prevent getting hungry and nauseous in the afternoons. We have reviewed the components of a successful postoperative course including requirement for a high protein, low carbohydrate diet, 64 oz a day of zero calorie liquids, daily vitamin supplementation, daily exercise (150 mis/week), regular follow-up, and participation in support groups. Refer to registered dietitian for more detailed medical nutrition therapy as needed. The primary encounter diagnosis was Post-resection malabsorption. Diagnoses of S/P gastric bypass, Hx of obesity, BMI 24.0-24.9, adult, Nausea, and Postural dizziness with presyncope were also pertinent to this visit. Follow-up and Dispositions    Return in about 3 months (around 3/1/2023). with labs, sooner as needed.   Joe Collazo NP

## 2022-12-05 DIAGNOSIS — R55 POSTURAL DIZZINESS WITH PRESYNCOPE: ICD-10-CM

## 2022-12-05 DIAGNOSIS — R11.0 NAUSEA: ICD-10-CM

## 2022-12-05 DIAGNOSIS — Z86.39 HX OF OBESITY: ICD-10-CM

## 2022-12-05 DIAGNOSIS — R42 POSTURAL DIZZINESS WITH PRESYNCOPE: ICD-10-CM

## 2022-12-05 DIAGNOSIS — Z98.84 S/P GASTRIC BYPASS: ICD-10-CM

## 2022-12-05 DIAGNOSIS — K91.2 POST-RESECTION MALABSORPTION: ICD-10-CM

## 2023-02-06 ENCOUNTER — TELEPHONE (OUTPATIENT)
Dept: SURGERY | Age: 43
End: 2023-02-06

## 2023-02-06 NOTE — TELEPHONE ENCOUNTER
The patient called for Stefan Wray NP. The patient states that is has been feeling well for the last couple of weeks. The patient states that she is drinking 60 oz of fluids and is not taking her vitamins. She would like for Stefan Wray to call her back.

## 2023-02-06 NOTE — TELEPHONE ENCOUNTER
Spoke with pt, she was consistent with taking vitamins for 2 weeks after last visit but has not taken since. She is now experiencing paraesthesias in hands and legs and also experiencing vision disturbances. Will schedule for B1 and B12 injection in office tomorrow. Long discussion regarding importance of taking recommended bariatric supplementation regularly to prevent vitamin deficiencies and asking her to examine what is holding her back from taking daily. She reports the taste as well as taking something daily as she had the surgery to get off daily medications. Reminded her that daily vitamin supplementation is a requirement after bariatric surgery and asked her what would work for her to get her taking what she needs regularly. She reports that she would be okay with taking Flintstones and calcium daily and giving herself B1 and B12 injections monthly and weekly high dose vitamin D. Will discuss further with Brody Carlson RD for additional recommendations and prescribe injections.

## 2023-02-07 ENCOUNTER — CLINICAL SUPPORT (OUTPATIENT)
Dept: SURGERY | Age: 43
End: 2023-02-07
Payer: MEDICAID

## 2023-02-07 DIAGNOSIS — Z98.84 S/P GASTRIC BYPASS: ICD-10-CM

## 2023-02-07 DIAGNOSIS — K91.2 POST-RESECTION MALABSORPTION: Primary | ICD-10-CM

## 2023-02-07 DIAGNOSIS — R20.2 PARESTHESIA: ICD-10-CM

## 2023-02-07 PROCEDURE — 96372 THER/PROPH/DIAG INJ SC/IM: CPT | Performed by: NURSE PRACTITIONER

## 2023-02-07 RX ORDER — CYANOCOBALAMIN 1000 UG/ML
1000 INJECTION, SOLUTION INTRAMUSCULAR; SUBCUTANEOUS ONCE
Status: COMPLETED | OUTPATIENT
Start: 2023-02-07 | End: 2023-02-07

## 2023-02-07 RX ORDER — THIAMINE HYDROCHLORIDE 100 MG/ML
200 INJECTION, SOLUTION INTRAMUSCULAR; INTRAVENOUS
Status: COMPLETED | OUTPATIENT
Start: 2023-02-07 | End: 2023-02-07

## 2023-02-07 RX ADMIN — THIAMINE HYDROCHLORIDE 200 MG: 100 INJECTION, SOLUTION INTRAMUSCULAR; INTRAVENOUS at 14:37

## 2023-02-07 RX ADMIN — CYANOCOBALAMIN 1000 MCG: 1000 INJECTION, SOLUTION INTRAMUSCULAR; SUBCUTANEOUS at 14:35

## 2023-02-09 ENCOUNTER — DOCUMENTATION ONLY (OUTPATIENT)
Dept: SURGERY | Age: 43
End: 2023-02-09

## 2023-02-09 NOTE — PROGRESS NOTES
Called pt to see how she is doing after discussion Monday regarding vitamins as well as injections on Tuesday. She reports she took her Procare Bariatric MVI capsule last night and is planning on taking it nightly. Again stressed importance of taking daily to prevent vitamin deficiencies. Will call back next week to check in and see how she's doing.

## 2023-02-17 ENCOUNTER — HOSPITAL ENCOUNTER (OUTPATIENT)
Dept: WOMENS IMAGING | Facility: HOSPITAL | Age: 43
End: 2023-02-17
Payer: MEDICAID

## 2023-02-17 DIAGNOSIS — Z12.31 ENCOUNTER FOR MAMMOGRAM TO ESTABLISH BASELINE MAMMOGRAM: ICD-10-CM

## 2023-02-17 PROCEDURE — 77067 SCR MAMMO BI INCL CAD: CPT

## 2023-02-23 DIAGNOSIS — Z12.31 ENCOUNTER FOR SCREENING MAMMOGRAM FOR BREAST CANCER: Primary | ICD-10-CM

## 2023-03-06 ENCOUNTER — TELEPHONE (OUTPATIENT)
Age: 43
End: 2023-03-06

## 2023-03-06 NOTE — TELEPHONE ENCOUNTER
Patient calling concerned because for more than a month now she has been experiencing some on and off chest pain. Patient states that they usually last about 4 minutes and the pain is so severe it takes her breath away. States that someone has to sit with her when it happens because  it is so severe.  Would like a call back from the nurse to see if she needs to be seen sooner

## 2023-03-07 NOTE — TELEPHONE ENCOUNTER
Contacted pt at [facility] number. Two patient Identifiers confirmed. Advised pt per Dr Avinash Almonte. Pt verbalized understanding.

## 2023-03-07 NOTE — TELEPHONE ENCOUNTER
PCP: Tim Vee Jr, MD    Last appt: [unfilled]  Future Appointments   Date Time Provider Department Center   4/4/2023 10:00 AM SHANTEL Busch NP BS AMB   4/27/2023  3:45 PM Christos SAHA MD [facility] AMB       Requested Prescriptions     Pending Prescriptions Disp Refills    nitroGLYCERIN (NITROSTAT) 0.4 MG SL tablet 25 tablet 3     Sig: Place 1 tablet under the tongue every 5 minutes as needed for Chest pain up to max of 3 total doses. If no relief after 1 dose, call 911.    metoprolol succinate (TOPROL XL) 25 MG extended release tablet 30 tablet 3     Sig: Take 0.5 tablets by mouth daily

## 2023-03-07 NOTE — TELEPHONE ENCOUNTER
Contacted pt at [facility] number. Two patient Identifiers confirmed. Advised the Cp happens when she is at rest. Pt stated she did not got to ER when she had issue because it only last 4 minutes. She sted \" I just have my teenage son sit with me. \" Advised pt if she has issue again she soul go to ER for cardiac eval and I would forward to provider for recommendations. Pts appt moved rescheduled to sooner appt. Pt verbalized understanding.

## 2023-03-07 NOTE — TELEPHONE ENCOUNTER
Verbal order with read back Christos Galvez MD.  Toprol 12.5 mg   Nitro 0.4 mg tab prn for chest pain

## 2023-03-09 RX ORDER — NITROGLYCERIN 0.4 MG/1
0.4 TABLET SUBLINGUAL EVERY 5 MIN PRN
Qty: 25 TABLET | Refills: 3 | Status: SHIPPED | OUTPATIENT
Start: 2023-03-09

## 2023-03-09 RX ORDER — METOPROLOL SUCCINATE 25 MG/1
12.5 TABLET, EXTENDED RELEASE ORAL DAILY
Qty: 30 TABLET | Refills: 3 | Status: SHIPPED | OUTPATIENT
Start: 2023-03-09

## 2023-04-04 ENCOUNTER — OFFICE VISIT (OUTPATIENT)
Age: 43
End: 2023-04-04

## 2023-04-04 VITALS
RESPIRATION RATE: 14 BRPM | HEART RATE: 51 BPM | TEMPERATURE: 98.3 F | BODY MASS INDEX: 23.04 KG/M2 | DIASTOLIC BLOOD PRESSURE: 71 MMHG | WEIGHT: 152 LBS | SYSTOLIC BLOOD PRESSURE: 111 MMHG | OXYGEN SATURATION: 98 % | HEIGHT: 68 IN

## 2023-04-04 DIAGNOSIS — Z98.84 NONCOMPLIANCE WITH REQUIRED DIETARY REGIMEN FOLLOWING BARIATRIC SURGERY: ICD-10-CM

## 2023-04-04 DIAGNOSIS — Z98.84 S/P GASTRIC BYPASS: ICD-10-CM

## 2023-04-04 DIAGNOSIS — R11.0 NAUSEA: ICD-10-CM

## 2023-04-04 DIAGNOSIS — K91.2 POST-RESECTION MALABSORPTION: ICD-10-CM

## 2023-04-04 DIAGNOSIS — Z91.119 NONCOMPLIANCE WITH REQUIRED DIETARY REGIMEN FOLLOWING BARIATRIC SURGERY: ICD-10-CM

## 2023-04-04 DIAGNOSIS — Z86.39 HX OF OBESITY: ICD-10-CM

## 2023-04-04 DIAGNOSIS — K91.2 POST-RESECTION MALABSORPTION: Primary | ICD-10-CM

## 2023-04-04 RX ORDER — CYANOCOBALAMIN 1000 UG/ML
1000 INJECTION, SOLUTION INTRAMUSCULAR; SUBCUTANEOUS ONCE
Status: COMPLETED | OUTPATIENT
Start: 2023-04-04 | End: 2023-04-04

## 2023-04-04 RX ORDER — THIAMINE HYDROCHLORIDE 100 MG/ML
200 INJECTION, SOLUTION INTRAMUSCULAR; INTRAVENOUS ONCE
Status: COMPLETED | OUTPATIENT
Start: 2023-04-04 | End: 2023-04-04

## 2023-04-04 RX ADMIN — CYANOCOBALAMIN 1000 MCG: 1000 INJECTION, SOLUTION INTRAMUSCULAR; SUBCUTANEOUS at 11:06

## 2023-04-04 RX ADMIN — THIAMINE HYDROCHLORIDE 200 MG: 100 INJECTION, SOLUTION INTRAMUSCULAR; INTRAVENOUS at 11:07

## 2023-04-04 NOTE — PROGRESS NOTES
COLONOSCOPY      GASTRIC BYPASS SURGERY  03/16/2022    HYSTERECTOMY (CERVIX STATUS UNKNOWN)  10/2022    UPPER GASTROINTESTINAL ENDOSCOPY         Current Outpatient Medications   Medication Sig Dispense Refill    nitroGLYCERIN (NITROSTAT) 0.4 MG SL tablet Place 1 tablet under the tongue every 5 minutes as needed for Chest pain up to max of 3 total doses. If no relief after 1 dose, call 911. 25 tablet 3    metoprolol succinate (TOPROL XL) 25 MG extended release tablet Take 0.5 tablets by mouth daily 30 tablet 3    calcium citrate (CALCITRATE) 950 (200 Ca) MG tablet Take 1 tablet by mouth daily       No current facility-administered medications for this visit. Allergies   Allergen Reactions    Iron Other (See Comments)     Body feels like on fire  Burning per pt reports    Sertraline Other (See Comments)     Burning per pt reports       ROS:  Review of Systems   Constitutional:  Negative for chills, fatigue, fever and unexpected weight change. Eyes: Negative. Respiratory:  Negative for cough, shortness of breath and wheezing. Cardiovascular:  Positive for chest pain and palpitations. Gastrointestinal:  Positive for nausea. Negative for abdominal distention, abdominal pain, blood in stool, constipation, diarrhea and vomiting. Neurological: Negative. Negative for dizziness, seizures, syncope, weakness, light-headedness, numbness and headaches. Physical Exam:  Vitals:    04/04/23 1001   BP: 111/71   Pulse: 51   Resp: 14   Temp: 98.3 °F (36.8 °C)   SpO2: 98%   Weight: 152 lb (68.9 kg)   Height: 5' 8\" (1.727 m)      Body mass index is 23.11 kg/m². Physical Exam  Vitals and nursing note reviewed. Constitutional:       Appearance: Normal appearance. HENT:      Head: Normocephalic and atraumatic. Cardiovascular:      Rate and Rhythm: Normal rate and regular rhythm. Heart sounds: Normal heart sounds.    Pulmonary:      Effort: Pulmonary effort is normal.      Breath sounds: Normal breath

## 2023-04-06 ASSESSMENT — ENCOUNTER SYMPTOMS
ABDOMINAL PAIN: 0
NAUSEA: 1
DIARRHEA: 0
VOMITING: 0
CONSTIPATION: 0
BLOOD IN STOOL: 0
COUGH: 0
WHEEZING: 0
ABDOMINAL DISTENTION: 0
SHORTNESS OF BREATH: 0
EYES NEGATIVE: 1

## 2023-05-20 LAB
A/G RATIO: 1.8 RATIO (ref 1.1–2.6)
ALBUMIN SERPL-MCNC: 3.9 G/DL (ref 3.5–5)
ALP BLD-CCNC: 67 U/L (ref 25–115)
ALT SERPL-CCNC: 19 U/L (ref 5–40)
ANION GAP SERPL CALCULATED.3IONS-SCNC: 8 MMOL/L (ref 3–15)
AST SERPL-CCNC: 16 U/L (ref 10–37)
BASOPHILS # BLD: 1 % (ref 0–2)
BASOPHILS ABSOLUTE: 0 K/UL (ref 0–0.2)
BILIRUB SERPL-MCNC: 1.9 MG/DL (ref 0.2–1.2)
BUN BLDV-MCNC: 14 MG/DL (ref 6–22)
CALCIUM SERPL-MCNC: 9.8 MG/DL (ref 8.4–10.5)
CHLORIDE BLD-SCNC: 103 MMOL/L (ref 98–110)
CO2: 28 MMOL/L (ref 20–32)
CREAT SERPL-MCNC: 0.5 MG/DL (ref 0.5–1.2)
EOSINOPHIL # BLD: 2 % (ref 0–6)
EOSINOPHILS ABSOLUTE: 0.1 K/UL (ref 0–0.5)
FERRITIN: 137 NG/ML (ref 10–291)
GLOBULIN: 2.2 G/DL (ref 2–4)
GLOMERULAR FILTRATION RATE: >60 ML/MIN/1.73 SQ.M.
GLUCOSE: 77 MG/DL (ref 70–99)
HCT VFR BLD CALC: 41.9 % (ref 35.1–46.5)
HEMOGLOBIN: 13.3 G/DL (ref 11.7–15.5)
IRON: 91 MCG/DL (ref 30–160)
LYMPHOCYTES # BLD: 37 % (ref 20–45)
LYMPHOCYTES ABSOLUTE: 1.8 K/UL (ref 1–4.8)
MCH RBC QN AUTO: 28 PG (ref 26–34)
MCHC RBC AUTO-ENTMCNC: 32 G/DL (ref 31–36)
MCV RBC AUTO: 90 FL (ref 80–99)
MONOCYTES ABSOLUTE: 0.4 K/UL (ref 0.1–1)
MONOCYTES: 7 % (ref 3–12)
NEUTROPHILS ABSOLUTE: 2.6 K/UL (ref 1.8–7.7)
NEUTROPHILS: 53 % (ref 40–75)
PDW BLD-RTO: 13.2 % (ref 10–15.5)
PLATELET # BLD: 175 K/UL (ref 140–440)
PMV BLD AUTO: 11.4 FL (ref 9–13)
POTASSIUM SERPL-SCNC: 4.4 MMOL/L (ref 3.5–5.5)
RBC: 4.68 M/UL (ref 3.8–5.2)
SODIUM BLD-SCNC: 139 MMOL/L (ref 133–145)
THIAMINE BLOOD: 141 NMOL/L (ref 78–185)
TOTAL PROTEIN: 6.1 G/DL (ref 6.4–8.3)
VITAMIN B-12: 536 PG/ML (ref 211–911)
VITAMIN D 25-HYDROXY: 34.6 NG/ML (ref 32–100)
WBC: 4.9 K/UL (ref 4–11)

## 2023-06-01 ENCOUNTER — OFFICE VISIT (OUTPATIENT)
Age: 43
End: 2023-06-01
Payer: MEDICAID

## 2023-06-01 VITALS
DIASTOLIC BLOOD PRESSURE: 71 MMHG | HEART RATE: 57 BPM | WEIGHT: 162 LBS | SYSTOLIC BLOOD PRESSURE: 109 MMHG | BODY MASS INDEX: 24.63 KG/M2 | OXYGEN SATURATION: 100 %

## 2023-06-01 DIAGNOSIS — R00.2 PALPITATIONS: ICD-10-CM

## 2023-06-01 DIAGNOSIS — R07.9 CHEST PAIN, UNSPECIFIED TYPE: ICD-10-CM

## 2023-06-01 DIAGNOSIS — I10 ESSENTIAL HYPERTENSION WITH GOAL BLOOD PRESSURE LESS THAN 140/90: Primary | ICD-10-CM

## 2023-06-01 PROCEDURE — 3074F SYST BP LT 130 MM HG: CPT | Performed by: INTERNAL MEDICINE

## 2023-06-01 PROCEDURE — 3078F DIAST BP <80 MM HG: CPT | Performed by: INTERNAL MEDICINE

## 2023-06-01 PROCEDURE — 99214 OFFICE O/P EST MOD 30 MIN: CPT | Performed by: INTERNAL MEDICINE

## 2023-06-01 RX ORDER — CLOPIDOGREL BISULFATE 75 MG/1
75 TABLET ORAL DAILY
Qty: 30 TABLET | Refills: 3 | Status: SHIPPED | OUTPATIENT
Start: 2023-06-01

## 2023-06-01 NOTE — PROGRESS NOTES
[facility]    Jus Kinney is 41 y.o. female with a history of morbid obesity status post gastric bypass surgery, hypertension, asthma    Patient is here today for follow-up appointment. .  She denies any prior history of MI or CHF. Patient has undergone echocardiogram  and stress test since last visit. She is still wearing event monitor. Patient had gastric bypass surgery in 2022 for morbid obesity with maximum weight of 310 pounds. Since then she has lost significant amount of weight and now she weighs about 175 pounds. Last few weeks, patient had 2 or 3 different episode of chest pain with some dyspnea and sweating requiring nitroglycerin with partial relief. It was not exertional.  There was no nausea or vomiting. Denies any nausea, vomiting, abdominal pain, fever, chills, sputum production. No hematuria or other bleeding complaints     Past Medical History:   Diagnosis Date    Anemia     receives iron infusions    Asthma     Last attack     GERD (gastroesophageal reflux disease)     H/O gastric bypass 2022    Hypertension     Morbid obesity ([facility] [address]        Review of Systems:  Cardiac symptoms as noted above in HPI. All others negative. Current Outpatient Medications   Medication Sig    nitroGLYCERIN (NITROSTAT) 0.4 MG SL tablet Place 1 tablet under the tongue every 5 minutes as needed for Chest pain up to max of 3 total doses. If no relief after 1 dose, call 911. metoprolol succinate (TOPROL XL) 25 MG extended release tablet Take 0.5 tablets by mouth daily    calcium citrate (CALCITRATE) 950 (200 Ca) MG tablet Take 1 tablet by mouth daily     No current facility-administered medications for this visit.        Past Surgical History:   Procedure Laterality Date     SECTION      CHOLECYSTECTOMY      COLONOSCOPY      GASTRIC BYPASS SURGERY  2022    HYSTERECTOMY (CERVIX STATUS UNKNOWN)  10/2022    UPPER
been marked not taking to be removed per Verbal order and read back per Jannette Leventhal, MD

## 2023-06-01 NOTE — PATIENT INSTRUCTIONS
Testing   [email or web address]     [phone]- please call if you do not get a call to be scheduled within a week or 2      **call office 3-5 days after testing is completed for results** Please ensure testing is completed prior to scheduled follow up appointment. If it is not completed your appointment may be rescheduled**    New Medication/Medication Changes  Start Plavix 75 mg take 1 tab daily     **please allow 24-48 hrs for medication to be escribed to pharmacy** If you need any refills on medications please contact your pharmacy so that the request can be escribed to the provider for review seven to 10 days prior to being out of medication.

## 2023-07-06 ENCOUNTER — OFFICE VISIT (OUTPATIENT)
Age: 43
End: 2023-07-06

## 2023-07-06 VITALS
BODY MASS INDEX: 24.1 KG/M2 | WEIGHT: 159 LBS | HEART RATE: 69 BPM | HEIGHT: 68 IN | DIASTOLIC BLOOD PRESSURE: 72 MMHG | TEMPERATURE: 97 F | OXYGEN SATURATION: 95 % | SYSTOLIC BLOOD PRESSURE: 110 MMHG

## 2023-07-06 DIAGNOSIS — K91.2 POST-RESECTION MALABSORPTION: Primary | ICD-10-CM

## 2023-07-06 DIAGNOSIS — Z91.119 NONCOMPLIANCE WITH REQUIRED DIETARY REGIMEN FOLLOWING BARIATRIC SURGERY: ICD-10-CM

## 2023-07-06 DIAGNOSIS — Z98.84 S/P GASTRIC BYPASS: ICD-10-CM

## 2023-07-06 DIAGNOSIS — Z86.39 HX OF OBESITY: ICD-10-CM

## 2023-07-06 DIAGNOSIS — Z98.84 NONCOMPLIANCE WITH REQUIRED DIETARY REGIMEN FOLLOWING BARIATRIC SURGERY: ICD-10-CM

## 2023-07-06 RX ORDER — THIAMINE HYDROCHLORIDE 100 MG/ML
200 INJECTION, SOLUTION INTRAMUSCULAR; INTRAVENOUS ONCE
Status: COMPLETED | OUTPATIENT
Start: 2023-07-06 | End: 2023-07-06

## 2023-07-06 RX ORDER — CYANOCOBALAMIN 1000 UG/ML
1000 INJECTION, SOLUTION INTRAMUSCULAR; SUBCUTANEOUS ONCE
Status: COMPLETED | OUTPATIENT
Start: 2023-07-06 | End: 2023-07-06

## 2023-07-06 RX ADMIN — THIAMINE HYDROCHLORIDE 200 MG: 100 INJECTION, SOLUTION INTRAMUSCULAR; INTRAVENOUS at 12:00

## 2023-07-06 RX ADMIN — CYANOCOBALAMIN 1000 MCG: 1000 INJECTION, SOLUTION INTRAMUSCULAR; SUBCUTANEOUS at 11:58

## 2023-07-06 NOTE — PROGRESS NOTES
Bariatric Postoperative Progress Note    CC: 16 Month Bariatric Follow Up    Jhonatan Stark is a 41 y.o. female now 16 months status post laparoscopic gastric bypass surgery performed on 3/16/22. Diet consists of  tuna, chicken, variety of fruit and vegetables. Taking in 40+ oz sugar free fluids,  unknown g protein. 60 min of activity 3 days a week. Bowel movements are regular. She is not compliant with recommended bariatric vitamin supplementation. She was previously doing well taking daily after last visit until about a month ago, now taking sporadically when she remembers. Reports eating regularly and not skipping meals, nausea has improved. Dizziness is about the same. Work up for chest pain is still ongoing, she reports echo and holter monitor were relatively normal. Was started on Plavix and continues to take metoprolol, scheduled for CTA in August.   Now has joint custody of two grandchildren. ETOH (occasionally), denies tobacco and NSAIDs. Would like to stay in the 150s  Weight Loss Metrics 7/6/2023 6/1/2023 4/4/2023 12/1/2022 10/13/2022 10/4/2022 9/13/2022   Pre-op weight (manual entry) 265 lbs - 265 lbs - - - -   Height 5' 8\" - 5' 8\" 5' 8\" 5' 8\" - 5' 8\"   Weight 159 lbs 162 lbs 152 lbs 161 lbs 172 lbs 175 lbs 180 lbs   BMI (Calculated) 24.2 kg/m2 0 kg/m2 23.2 kg/m2 24.5 kg/m2 26.2 kg/m2 0 kg/m2 27.4 kg/m2   Ideal body weight (manual entry) 143 lbs - 143 lbs - - - -   EBW in lbs. 122 - 122 - - - -   Goal weight 167 lbs - 167 lbs - - - -   Weight loss to date in lbs.  106 - 113 - - - -   Percent weight loss (%) 40 % - 42.64 % - - - -   Percent EBW loss (%) 86.9 % - 92.6 % - - - -   Some recent data might be hidden       Comorbidities:  Hypertension: resolved  Diabetes: not applicable  Obstructive Sleep Apnea: resolved  Hyperlipidemia: not applicable  Stress Urinary Incontinence: resolved  Gastroesophageal Reflux: resolved  Weight related arthropathy:improved      Past Medical History:

## 2023-07-07 ASSESSMENT — ENCOUNTER SYMPTOMS
COUGH: 0
DIARRHEA: 0
CONSTIPATION: 0
EYES NEGATIVE: 1
SHORTNESS OF BREATH: 0
NAUSEA: 0
VOMITING: 0
ABDOMINAL PAIN: 0

## 2023-09-07 ENCOUNTER — OFFICE VISIT (OUTPATIENT)
Age: 43
End: 2023-09-07

## 2023-09-07 VITALS
SYSTOLIC BLOOD PRESSURE: 102 MMHG | DIASTOLIC BLOOD PRESSURE: 65 MMHG | WEIGHT: 162 LBS | BODY MASS INDEX: 24.63 KG/M2 | HEART RATE: 70 BPM | OXYGEN SATURATION: 98 %

## 2023-09-07 DIAGNOSIS — R07.9 CHEST PAIN, UNSPECIFIED TYPE: Primary | ICD-10-CM

## 2023-09-07 NOTE — PROGRESS NOTES
[facility]    Dom Valiente is 41 y.o. female with a history of morbid obesity status post gastric bypass surgery, hypertension, asthma    Patient is here today for follow-up appointment. .  She denies any prior history of MI or CHF. Patient has undergone echocardiogram  and stress test since last visit. She is still wearing event monitor. Patient had gastric bypass surgery in 03/2022 for morbid obesity with maximum weight of 310 pounds. Since then she has lost significant amount of weight and now she weighs about 175 pounds. Patient continues to have on and off random episode of chest discomfort, nonexertional without any associated symptoms. Last episode lasted for several hours  She took nitroglycerin however it does not help. She perform exercise 2 or 3 times a week. She goes to gym and she would exercise for about 60 to 90 minutes without any exertional chest pain or chest tightness. No nausea vomiting diaphoresis. Denies any nausea, vomiting, abdominal pain, fever, chills, sputum production. No hematuria or other bleeding complaints     Past Medical History:   Diagnosis Date    Anemia     receives iron infusions    Asthma     Last attack 2017    GERD (gastroesophageal reflux disease)     H/O gastric bypass 03/2022    Hypertension     Morbid obesity ([address])        Review of Systems:  Cardiac symptoms as noted above in HPI. All others negative. Current Outpatient Medications   Medication Sig    clopidogrel (PLAVIX) 75 MG tablet Take 1 tablet by mouth daily    nitroGLYCERIN (NITROSTAT) 0.4 MG SL tablet Place 1 tablet under the tongue every 5 minutes as needed for Chest pain up to max of 3 total doses. If no relief after 1 dose, call 911.     metoprolol succinate (TOPROL XL) 25 MG extended release tablet Take 0.5 tablets by mouth daily    calcium citrate (CALCITRATE) 950 (200 Ca) MG tablet Take 1 tablet by mouth daily (Patient

## 2023-09-07 NOTE — PROGRESS NOTES
Identified pt with two pt identifiers(name and ). Reviewed record in preparation for visit and have obtained necessary documentation. Jhonatan Stark presents today for No chief complaint on file. Pt c/o DIZZINESS, SOB, CHEST PAIN/ PRESSURE, FATIGUE HEADACHES. Jhonatan Stark preferred language for health care discussion is english/other. Personal Protective Equipment:   Personal Protective Equipment was used including: mask-surgical and hands-gloves. Patient was placed on no precaution(s). Patient was not masked. Precautions:   Patient currently on None  Patient currently roomed with door closed. Is someone accompanying this pt? grandchildren    Is the patient using any DME equipment during [address]? no    Depression Screening:  PHQ-9 Questionaire 10/4/2022 2022 2022   Little interest or pleasure in doing things 0 0 0   Feeling down, depressed, or hopeless 0 0 0   PHQ-9 Total Score 0 0 0        Learning Assessment:  No question data found. Abuse Screening: AMB Abuse Screening 2023   Do you ever feel afraid of your partner? N N   Are you in a relationship with someone who physically or mentally threatens you? N N   Is it safe for you to go home? Y Y          Fall Risk  Fall Risk 2023   2 or more falls in past year? no no   Fall with injury in past year? no no         Pt currently taking Anticoagulant therapy? no  Pt currently taking Antiplatelet therapy? no    Coordination of Care:  1. Have you been to the ER, urgent care clinic since your last visit? Hospitalized since your last visit? no    2. Have you seen or consulted any other health care providers outside of the [address] [place] [address] since your last visit? Include any pap smears or colon screening. no      Please see Red banners under Allergies and Med Rec to remove outside inquires. All correct information has been verified with patient and added to chart.      Medication's patient's

## 2023-10-10 ENCOUNTER — OFFICE VISIT (OUTPATIENT)
Age: 43
End: 2023-10-10

## 2023-10-10 VITALS
HEART RATE: 65 BPM | OXYGEN SATURATION: 99 % | DIASTOLIC BLOOD PRESSURE: 60 MMHG | HEIGHT: 68 IN | SYSTOLIC BLOOD PRESSURE: 106 MMHG | RESPIRATION RATE: 16 BRPM | TEMPERATURE: 98.4 F | WEIGHT: 164 LBS | BODY MASS INDEX: 24.86 KG/M2

## 2023-10-10 DIAGNOSIS — Z91.119 NONCOMPLIANCE WITH REQUIRED DIETARY REGIMEN FOLLOWING BARIATRIC SURGERY: ICD-10-CM

## 2023-10-10 DIAGNOSIS — K90.821 SHORT BOWEL SYNDROME WITH COLON IN CONTINUITY: Primary | ICD-10-CM

## 2023-10-10 DIAGNOSIS — R10.13 EPIGASTRIC PAIN: ICD-10-CM

## 2023-10-10 DIAGNOSIS — Z86.39 HX OF OBESITY: ICD-10-CM

## 2023-10-10 DIAGNOSIS — Z98.84 S/P GASTRIC BYPASS: ICD-10-CM

## 2023-10-10 DIAGNOSIS — Z98.84 NONCOMPLIANCE WITH REQUIRED DIETARY REGIMEN FOLLOWING BARIATRIC SURGERY: ICD-10-CM

## 2023-10-10 RX ORDER — CYANOCOBALAMIN 1000 UG/ML
1000 INJECTION, SOLUTION INTRAMUSCULAR; SUBCUTANEOUS ONCE
Status: COMPLETED | OUTPATIENT
Start: 2023-10-10 | End: 2023-10-10

## 2023-10-10 RX ORDER — THIAMINE HYDROCHLORIDE 100 MG/ML
200 INJECTION, SOLUTION INTRAMUSCULAR; INTRAVENOUS ONCE
Status: COMPLETED | OUTPATIENT
Start: 2023-10-10 | End: 2023-10-10

## 2023-10-10 RX ORDER — HYOSCYAMINE SULFATE 0.12 MG/1
1 TABLET SUBLINGUAL EVERY 6 HOURS PRN
Qty: 60 EACH | Refills: 1 | Status: SHIPPED | OUTPATIENT
Start: 2023-10-10

## 2023-10-10 RX ADMIN — THIAMINE HYDROCHLORIDE 200 MG: 100 INJECTION, SOLUTION INTRAMUSCULAR; INTRAVENOUS at 11:34

## 2023-10-10 RX ADMIN — CYANOCOBALAMIN 1000 MCG: 1000 INJECTION, SOLUTION INTRAMUSCULAR; SUBCUTANEOUS at 11:33

## 2023-10-10 NOTE — PROGRESS NOTES
Bariatric Postoperative Note    CC: 18 Month Bariatric Follow Up    Micha Anguiano is a 41 y.o. female now 18 months status post laparoscopic gastric bypass surgery performed on 3/16/2022. Doing well overall. Diet consists of fish, vegetables, fruits, and peanut butter crackers. Taking in 50  oz sugar free fluids,  unknown g protein. 30-60 min of activity 7 days a week. Bowel movements are regular. She is not compliant with recommended bariatric vitamin supplementation, as she is taking roughly 2 days weekly. Completed workup with cardiology for chest pain with normal echo, stress test, and CT coronary angiogram. Was taken of Plavix, metoprolol, and nitro. Was recommended to follow up with PCP and GI for further workup. Reports significant epigastric pain that will occur about 3 x monthly and will last around 5 minutes. She reports she will get flushed and sweaty during this. Pain not related to movement, BMs, or food intake. Denies n/v. Denies any NSAID, ETOH, or tobacco use. 10/10/2023    10:25 AM 9/7/2023     2:09 PM 7/6/2023    11:26 AM 6/1/2023     3:35 PM 4/4/2023    10:01 AM 12/1/2022    10:25 AM 10/13/2022    11:07 AM   Weight Loss Metrics   Pre-op weight (manual entry) 265 lbs  265 lbs  265 lbs     Height 5' 8\"  5' 8\"  5' 8\" 5' 8\" 5' 8\"   Weight - Scale 164 lbs 162 lbs 159 lbs 162 lbs 152 lbs 161 lbs 172 lbs   BMI (Calculated) 25 kg/m2 0 kg/m2 24.2 kg/m2 0 kg/m2 23.2 kg/m2 24.5 kg/m2 26.2 kg/m2   Ideal body weight (manual entry) 143 lbs  143 lbs  143 lbs     EBW in lbs. 122  122  122     Goal weight   167 lbs  167 lbs     Weight loss to date in lbs.  101  106  113     Percent weight loss (%) 38.11 %  40 %  42.64 %     Percent EBW loss (%) 82.8 %  86.9 %  92.6 %         Comorbidities:  Hypertension: resolved  Diabetes: not applicable  Obstructive Sleep Apnea: resolved  Hyperlipidemia: not applicable  Stress Urinary Incontinence: resolved  Gastroesophageal Reflux: resolved  Weight

## 2023-10-10 NOTE — H&P (VIEW-ONLY)
Bariatric Postoperative Note    CC: 18 Month Bariatric Follow Up    Lura Epley is a 41 y.o. female now 18 months status post laparoscopic gastric bypass surgery performed on 3/16/2022. Doing well overall. Diet consists of fish, vegetables, fruits, and peanut butter crackers. Taking in 50  oz sugar free fluids,  unknown g protein. 30-60 min of activity 7 days a week. Bowel movements are regular. She is not compliant with recommended bariatric vitamin supplementation, as she is taking roughly 2 days weekly. Completed workup with cardiology for chest pain with normal echo, stress test, and CT coronary angiogram. Was taken of Plavix, metoprolol, and nitro. Was recommended to follow up with PCP and GI for further workup. Reports significant epigastric pain that will occur about 3 x monthly and will last around 5 minutes. She reports she will get flushed and sweaty during this. Pain not related to movement, BMs, or food intake. Denies n/v. Denies any NSAID, ETOH, or tobacco use. 10/10/2023    10:25 AM 9/7/2023     2:09 PM 7/6/2023    11:26 AM 6/1/2023     3:35 PM 4/4/2023    10:01 AM 12/1/2022    10:25 AM 10/13/2022    11:07 AM   Weight Loss Metrics   Pre-op weight (manual entry) 265 lbs  265 lbs  265 lbs     Height 5' 8\"  5' 8\"  5' 8\" 5' 8\" 5' 8\"   Weight - Scale 164 lbs 162 lbs 159 lbs 162 lbs 152 lbs 161 lbs 172 lbs   BMI (Calculated) 25 kg/m2 0 kg/m2 24.2 kg/m2 0 kg/m2 23.2 kg/m2 24.5 kg/m2 26.2 kg/m2   Ideal body weight (manual entry) 143 lbs  143 lbs  143 lbs     EBW in lbs. 122  122  122     Goal weight   167 lbs  167 lbs     Weight loss to date in lbs.  101  106  113     Percent weight loss (%) 38.11 %  40 %  42.64 %     Percent EBW loss (%) 82.8 %  86.9 %  92.6 %         Comorbidities:  Hypertension: resolved  Diabetes: not applicable  Obstructive Sleep Apnea: resolved  Hyperlipidemia: not applicable  Stress Urinary Incontinence: resolved  Gastroesophageal Reflux: resolved  Weight

## 2023-10-13 ASSESSMENT — ENCOUNTER SYMPTOMS
DIARRHEA: 0
ABDOMINAL PAIN: 0
CONSTIPATION: 0
NAUSEA: 0
VOMITING: 0
COUGH: 0
SHORTNESS OF BREATH: 0

## 2023-10-30 NOTE — PERIOP NOTE
Instructions for your surgery at [address] [place] [address] Date:  10/30/2023      Patient's Name:  Migdalia Gupta           Surgery Date:  11/3/2023              Please enter the main entrance of the hospital and check-[facility] at the  [facility] Once checked in at the , you will take the elevators to the second floor, and report to the waiting room on the left. The room will say Procedure Registration. Do NOT eat or drink anything, including candy, gum, or ice chips after midnight prior to your surgery, unless you have specific instructions from your surgeon or anesthesia provider to do so. Brush your teeth before coming to the hospital. You may swish with water, but do not swallow. No smoking/Vaping/E-Cigarettes 24 hours prior to the day of surgery. No alcohol 24 hours prior to the day of surgery. No recreational drugs for one week prior to the day of surgery. Bring Photo ID, Insurance information, and Co-pay if required on day of surgery. Bring in pertinent legal documents, such as, Medical Power of THIERRY BROWER, DNR, Advance Directive, etc.  Leave all valuables, including money/purse, at home. Remove all jewelry, including ALL body piercings, nail polish, acrylic nails, and makeup (including mascara); no lotions, powders, deodorant, or perfume/cologne/after shave on the skin. Follow instruction for Hibiclens washes and CHG wipes from surgeon's office. Glasses and dentures may be worn to the hospital. They must be removed prior to surgery. Please bring case/container for glasses or dentures. Contact lenses should not be worn on day of surgery. Call your doctor's office if symptoms of a cold or illness develop within 24-48 hours prior to your surgery. Call your doctor's office if you have any questions concerning insurance or co-pays. 15. AN ADULT (relative or friend 25 years or older) [address]   16. Please make

## 2023-11-02 ENCOUNTER — PREP FOR PROCEDURE (OUTPATIENT)
Age: 43
End: 2023-11-02

## 2023-11-02 ENCOUNTER — ANESTHESIA EVENT (OUTPATIENT)
Facility: HOSPITAL | Age: 43
End: 2023-11-02
Payer: MEDICAID

## 2023-11-02 RX ORDER — SODIUM CHLORIDE 0.9 % (FLUSH) 0.9 %
5-40 SYRINGE (ML) INJECTION EVERY 12 HOURS SCHEDULED
Status: CANCELLED | OUTPATIENT
Start: 2023-11-02

## 2023-11-02 RX ORDER — SODIUM CHLORIDE 9 MG/ML
INJECTION, SOLUTION INTRAVENOUS PRN
Status: CANCELLED | OUTPATIENT
Start: 2023-11-02

## 2023-11-02 RX ORDER — SODIUM CHLORIDE 0.9 % (FLUSH) 0.9 %
5-40 SYRINGE (ML) INJECTION PRN
Status: CANCELLED | OUTPATIENT
Start: 2023-11-02

## 2023-11-03 ENCOUNTER — HOSPITAL ENCOUNTER (OUTPATIENT)
Facility: HOSPITAL | Age: 43
Setting detail: OUTPATIENT SURGERY
Discharge: HOME | End: 2023-11-03
Attending: STUDENT IN AN ORGANIZED HEALTH CARE EDUCATION/TRAINING PROGRAM | Admitting: STUDENT IN AN ORGANIZED HEALTH CARE EDUCATION/TRAINING PROGRAM
Payer: MEDICAID

## 2023-11-03 ENCOUNTER — ANESTHESIA (OUTPATIENT)
Facility: HOSPITAL | Age: 43
End: 2023-11-03
Payer: MEDICAID

## 2023-11-03 VITALS
SYSTOLIC BLOOD PRESSURE: 110 MMHG | DIASTOLIC BLOOD PRESSURE: 72 MMHG | RESPIRATION RATE: 14 BRPM | WEIGHT: 160 LBS | HEART RATE: 48 BPM | HEIGHT: 68 IN | BODY MASS INDEX: 24.25 KG/M2 | TEMPERATURE: 98 F | OXYGEN SATURATION: 100 %

## 2023-11-03 DIAGNOSIS — Z98.84 BARIATRIC SURGERY STATUS: Primary | ICD-10-CM

## 2023-11-03 PROCEDURE — 7100000001 HC PACU RECOVERY - ADDTL 15 MIN: Performed by: STUDENT IN AN ORGANIZED HEALTH CARE EDUCATION/TRAINING PROGRAM

## 2023-11-03 PROCEDURE — 2580000003 HC RX 258: Performed by: NURSE ANESTHETIST, CERTIFIED REGISTERED

## 2023-11-03 PROCEDURE — 2500000003 HC RX 250 WO HCPCS: Performed by: NURSE ANESTHETIST, CERTIFIED REGISTERED

## 2023-11-03 PROCEDURE — 7100000011 HC PHASE II RECOVERY - ADDTL 15 MIN: Performed by: STUDENT IN AN ORGANIZED HEALTH CARE EDUCATION/TRAINING PROGRAM

## 2023-11-03 PROCEDURE — 6360000002 HC RX W HCPCS: Performed by: NURSE ANESTHETIST, CERTIFIED REGISTERED

## 2023-11-03 PROCEDURE — 2709999900 HC NON-CHARGEABLE SUPPLY: Performed by: STUDENT IN AN ORGANIZED HEALTH CARE EDUCATION/TRAINING PROGRAM

## 2023-11-03 PROCEDURE — 3700000000 HC ANESTHESIA ATTENDED CARE: Performed by: STUDENT IN AN ORGANIZED HEALTH CARE EDUCATION/TRAINING PROGRAM

## 2023-11-03 PROCEDURE — 88305 TISSUE EXAM BY PATHOLOGIST: CPT

## 2023-11-03 PROCEDURE — 3600007502: Performed by: STUDENT IN AN ORGANIZED HEALTH CARE EDUCATION/TRAINING PROGRAM

## 2023-11-03 PROCEDURE — 43239 EGD BIOPSY SINGLE/MULTIPLE: CPT | Performed by: STUDENT IN AN ORGANIZED HEALTH CARE EDUCATION/TRAINING PROGRAM

## 2023-11-03 PROCEDURE — 7100000000 HC PACU RECOVERY - FIRST 15 MIN: Performed by: STUDENT IN AN ORGANIZED HEALTH CARE EDUCATION/TRAINING PROGRAM

## 2023-11-03 PROCEDURE — 7100000010 HC PHASE II RECOVERY - FIRST 15 MIN: Performed by: STUDENT IN AN ORGANIZED HEALTH CARE EDUCATION/TRAINING PROGRAM

## 2023-11-03 PROCEDURE — 3700000001 HC ADD 15 MINUTES (ANESTHESIA): Performed by: STUDENT IN AN ORGANIZED HEALTH CARE EDUCATION/TRAINING PROGRAM

## 2023-11-03 PROCEDURE — 3600007512: Performed by: STUDENT IN AN ORGANIZED HEALTH CARE EDUCATION/TRAINING PROGRAM

## 2023-11-03 PROCEDURE — A4216 STERILE WATER/SALINE, 10 ML: HCPCS | Performed by: NURSE ANESTHETIST, CERTIFIED REGISTERED

## 2023-11-03 RX ORDER — SODIUM CHLORIDE, SODIUM LACTATE, POTASSIUM CHLORIDE, CALCIUM CHLORIDE 600; 310; 30; 20 MG/100ML; MG/100ML; MG/100ML; MG/100ML
INJECTION, SOLUTION INTRAVENOUS CONTINUOUS
Status: DISCONTINUED | OUTPATIENT
Start: 2023-11-03 | End: 2023-11-03 | Stop reason: HOSPADM

## 2023-11-03 RX ORDER — SODIUM CHLORIDE 0.9 % (FLUSH) 0.9 %
5-40 SYRINGE (ML) INJECTION PRN
Status: DISCONTINUED | OUTPATIENT
Start: 2023-11-03 | End: 2023-11-03 | Stop reason: HOSPADM

## 2023-11-03 RX ORDER — SODIUM CHLORIDE 0.9 % (FLUSH) 0.9 %
5-40 SYRINGE (ML) INJECTION EVERY 12 HOURS SCHEDULED
Status: DISCONTINUED | OUTPATIENT
Start: 2023-11-03 | End: 2023-11-03 | Stop reason: HOSPADM

## 2023-11-03 RX ORDER — ONDANSETRON 2 MG/ML
4 INJECTION INTRAMUSCULAR; INTRAVENOUS
Status: DISCONTINUED | OUTPATIENT
Start: 2023-11-03 | End: 2023-11-03 | Stop reason: HOSPADM

## 2023-11-03 RX ORDER — PROPOFOL 10 MG/ML
INJECTION, EMULSION INTRAVENOUS PRN
Status: DISCONTINUED | OUTPATIENT
Start: 2023-11-03 | End: 2023-11-03 | Stop reason: SDUPTHER

## 2023-11-03 RX ORDER — SODIUM CHLORIDE 9 MG/ML
INJECTION, SOLUTION INTRAVENOUS PRN
Status: DISCONTINUED | OUTPATIENT
Start: 2023-11-03 | End: 2023-11-03 | Stop reason: HOSPADM

## 2023-11-03 RX ORDER — LIDOCAINE HYDROCHLORIDE 10 MG/ML
1 INJECTION, SOLUTION EPIDURAL; INFILTRATION; INTRACAUDAL; PERINEURAL
Status: DISCONTINUED | OUTPATIENT
Start: 2023-11-03 | End: 2023-11-03 | Stop reason: HOSPADM

## 2023-11-03 RX ORDER — FENTANYL CITRATE 50 UG/ML
50 INJECTION, SOLUTION INTRAMUSCULAR; INTRAVENOUS EVERY 5 MIN PRN
Status: DISCONTINUED | OUTPATIENT
Start: 2023-11-03 | End: 2023-11-03 | Stop reason: HOSPADM

## 2023-11-03 RX ORDER — DIPHENHYDRAMINE HYDROCHLORIDE 50 MG/ML
12.5 INJECTION INTRAMUSCULAR; INTRAVENOUS
Status: DISCONTINUED | OUTPATIENT
Start: 2023-11-03 | End: 2023-11-03 | Stop reason: HOSPADM

## 2023-11-03 RX ORDER — DEXTROSE MONOHYDRATE 100 MG/ML
INJECTION, SOLUTION INTRAVENOUS CONTINUOUS PRN
Status: DISCONTINUED | OUTPATIENT
Start: 2023-11-03 | End: 2023-11-03 | Stop reason: HOSPADM

## 2023-11-03 RX ORDER — LIDOCAINE HYDROCHLORIDE 20 MG/ML
INJECTION, SOLUTION EPIDURAL; INFILTRATION; INTRACAUDAL; PERINEURAL PRN
Status: DISCONTINUED | OUTPATIENT
Start: 2023-11-03 | End: 2023-11-03 | Stop reason: SDUPTHER

## 2023-11-03 RX ADMIN — FAMOTIDINE 20 MG: 10 INJECTION, SOLUTION INTRAVENOUS at 10:58

## 2023-11-03 RX ADMIN — PROPOFOL 20 MG: 10 INJECTION, EMULSION INTRAVENOUS at 11:22

## 2023-11-03 RX ADMIN — PROPOFOL 30 MG: 10 INJECTION, EMULSION INTRAVENOUS at 11:24

## 2023-11-03 RX ADMIN — PROPOFOL 20 MG: 10 INJECTION, EMULSION INTRAVENOUS at 11:27

## 2023-11-03 RX ADMIN — SODIUM CHLORIDE, POTASSIUM CHLORIDE, SODIUM LACTATE AND CALCIUM CHLORIDE: 600; 310; 30; 20 INJECTION, SOLUTION INTRAVENOUS at 10:58

## 2023-11-03 RX ADMIN — PROPOFOL 80 MG: 10 INJECTION, EMULSION INTRAVENOUS at 11:20

## 2023-11-03 RX ADMIN — LIDOCAINE HYDROCHLORIDE 100 MG: 20 INJECTION, SOLUTION EPIDURAL; INFILTRATION; INTRACAUDAL; PERINEURAL at 11:20

## 2023-11-03 ASSESSMENT — PAIN - FUNCTIONAL ASSESSMENT: PAIN_FUNCTIONAL_ASSESSMENT: 0-10

## 2023-11-03 NOTE — DISCHARGE INSTRUCTIONS
START taking these medications       calcium citrate 950 (200 Ca) MG tablet  Commonly known as: CALCITRATE                CONTINUE taking these medications       fluticasone-umeclidin-vilant 200-62.5-25 MCG/ACT Aepb inhaler  Commonly known as: TRELEGY ELLIPTA      Hyoscyamine Sulfate SL 0.125 MG Subl  Commonly known as: Levsin/SL  Place 1 tablet under the tongue every 6 hours as needed (Pain/Spasm)     Special Instructions:            - No driving the day of your procedure            - If a biopsy was taken, avoid aspirin or NSAIDs for 48 hours. - Your results will be available for discussion at your next appointment            - [address] [facility] for a Temp >100.5 or Pulse>115     Follow-up with your surgeon in as directed, or call office for appointment  [phone]       Upper GI Endoscopy: What to Expect at [facility] had an upper GI endoscopy. Your doctor used a thin, lighted tube that bends to look at the inside of your esophagus, your stomach, and the first part of the small intestine, called the duodenum. After you have an endoscopy, you will stay at the hospital or clinic for 1 to 2 hours. This will allow the medicine to wear off. You will be able to go home after your doctor or nurse checks to make sure that you're not having any problems. You may have to stay overnight if you had treatment during the test. You may have a sore throat for a day or two after the test.    This care sheet gives you a general idea about what to expect after the test.    How can you care for yourself at home? Activity   Rest as much as you need to after you go home. You should be able to go back to your usual activities the day after the test.    Diet   Follow your doctor's directions for eating after the test.  Drink plenty of fluids (unless your doctor has told you not to).     Medications   If you have a sore throat the day after the test, use an over-the-counter

## 2023-11-03 NOTE — INTERVAL H&P NOTE
Update History & Physical    The patient's History and Physical of October 10, 2023 was reviewed with the patient and I examined the patient. There was no change. The surgical site was confirmed by the patient and me. Plan: The risks, benefits, expected outcome, and alternative to the recommended procedure have been discussed with the patient. Patient understands and wants to proceed with the procedure.      Electronically signed by Nick Woo MD on 11/3/2023 at 11:06 AM

## 2023-11-03 NOTE — OP NOTE
OPERATIVE REPORT     Patient:Johnna An   : 1980  Medical Record Number:[id number]    Pre-operative Diagnosis:  Epigastric pain [R10.13]  Dyskinesia [G24.9]                  Post-operative Diagnosis: Epigastric pain [R10.13]  Dyskinesia [G24.9]                 Procedure: EGD with biopsy                  [facility]                  Surgeon: Emery Mcconnell MD                  Assistant:   None    Anesthesia: MAC    Specimen:  Z line    EBL: less than 10 cc    Complications: none    Procedure in Detail: Stephie Amador was identified in the pre-operative holding area. Informed consent was obtained after a complete discussion of risks, benefits and alternatives to surgery were had with the patient. The patient was brought back to the endoscopy room and placed under MAC in the semi upright/ left lateral decubitus position on the operating room table. A timeout was performed verifying patient identity, planned procedure, medications, and all other pertinent aspects of the case. The patient was appropriately padded and secured to the table. A bite block was applied. Once adequate sedation was achieved, the gastroscope was introduced transorally into the esophagus. The esophagus was traversed, and the scope carefully advanced under direct visualization to the Proximal olga limb. The endoscope was slowly withdrawn, and at the conclusion of the procedure, the traversed foregut was decompressed as the endoscope removed. Pertinent findings as below:    Esophagus: There was no esophagitis present. Z line noted at 38 cm cm. Diaphgragmatic pinch noted at 38 cm. There was no apparent hiatal hernia    Gastric Pouch: Gastric pouch was insufflated and noted to be appropriately sized. The length of the pouch was 5 cm. Gastrojejunostomy appeared to be mildly dilated. There were no marginal ulcers or apparent fistulae present.   I copiously irrigated the anastomosis to ensure there

## 2023-11-03 NOTE — ANESTHESIA POSTPROCEDURE EVALUATION
Department of Anesthesiology  Postprocedure Note    Patient: Bessie Munroe  MRN: [id number]  YOB: 1980  Date of evaluation: 11/3/2023      Procedure Summary     Date: 11/03/23 Room / Location: [facility] ENDO 01 / [facility] END[facility]    Anesthesia Start: 1115 Anesthesia Stop: 6982    Procedure: DIAGNOSTIC ESOPHAGOGASTRODUODENOSCOPY  with Bx's (Upper GI Region) Diagnosis:       Epigastric pain      Dyskinesia      (Epigastric pain [R10.13])      (Dyskinesia [G24.9])    Surgeons: Gianluca Recinos MD Responsible Provider: Jose Ellison MD    Anesthesia Type: MAC ASA Status: 2          Anesthesia Type: MAC    Frandy Phase I: Frandy Score: 10    Frandy Phase II:        Anesthesia Post Evaluation    Patient location during evaluation: bedside  Patient participation: complete - patient participated  Airway patency: patent  Complications: no  Cardiovascular status: hemodynamically stable  Respiratory status: acceptable  Hydration status: stable

## 2023-11-03 NOTE — DISCHARGE SUMMARY
Bariatric Surgery Endoscopy Progress Note    Admission Date: 11/3/2023    Discharge Date: 11/3/2023    Admission Diagnosis: Abdominal pain    Discharge Diagnosis: Abdominal pain     Procedures: EGD with biopsy    Postop Complications: None    Hospital Course:  Patient was admitted on 11/3/2023 for scheduled outpatient endoscopy. Operation was without significant complication. Patient was stable postoperatively and tolerating liquids in the recovery area. All vitals were normal and she emerged from anesthesia without incident and subsequently discharged home. Discharge Diet:  Continue liquid diet and advance to regular as tolerated    Discharge Medications:     Medication List        START taking these medications      calcium citrate 950 (200 Ca) MG tablet  Commonly known as: CALCITRATE            CONTINUE taking these medications      fluticasone-umeclidin-vilant 200-62.5-25 MCG/ACT Aepb inhaler  Commonly known as: TRELEGY ELLIPTA     Hyoscyamine Sulfate SL 0.125 MG Subl  Commonly known as: Levsin/SL  Place 1 tablet under the tongue every 6 hours as needed (Pain/Spasm)                Discharge disposition: home    Discharge condition: stable      Local wound care: None     Activity: as desired      Special Instructions:            - No driving the day of your procedure            - If a biopsy was taken, avoid aspirin or NSAIDs for 48 hours.             - Your results will be available for discussion at your next appointment            - [address] [facility] for a Temp >100.5 or Pulse>115     Follow-up with your surgeon in as directed, or call office for appointment  [phone]

## 2023-11-27 ENCOUNTER — HOSPITAL ENCOUNTER (OUTPATIENT)
Facility: HOSPITAL | Age: 43
Discharge: HOME | End: 2023-11-30
Attending: STUDENT IN AN ORGANIZED HEALTH CARE EDUCATION/TRAINING PROGRAM
Payer: MEDICAID

## 2023-11-27 DIAGNOSIS — Z98.84 BARIATRIC SURGERY STATUS: ICD-10-CM

## 2023-11-27 PROCEDURE — 74177 CT ABD & PELVIS W/CONTRAST: CPT

## 2023-11-27 PROCEDURE — 6360000004 HC RX CONTRAST MEDICATION: Performed by: STUDENT IN AN ORGANIZED HEALTH CARE EDUCATION/TRAINING PROGRAM

## 2023-11-27 RX ADMIN — IOPAMIDOL 100 ML: 612 INJECTION, SOLUTION INTRAVENOUS at 15:55

## 2023-11-27 RX ADMIN — DIATRIZOATE MEGLUMINE AND DIATRIZOATE SODIUM 30 ML: 660; 100 LIQUID ORAL; RECTAL at 14:55

## 2024-02-19 ENCOUNTER — HOSPITAL ENCOUNTER (OUTPATIENT)
Dept: WOMENS IMAGING | Facility: HOSPITAL | Age: 44
Discharge: HOME | End: 2024-02-22
Payer: MEDICAID

## 2024-02-19 DIAGNOSIS — Z12.31 VISIT FOR SCREENING MAMMOGRAM: ICD-10-CM

## 2024-02-19 PROCEDURE — 77063 BREAST TOMOSYNTHESIS BI: CPT

## 2025-02-04 ENCOUNTER — CLINICAL DOCUMENTATION (OUTPATIENT)
Facility: HOSPITAL | Age: 45
End: 2025-02-04

## 2025-02-24 ENCOUNTER — HOSPITAL ENCOUNTER (OUTPATIENT)
Dept: WOMENS IMAGING | Facility: HOSPITAL | Age: 45
Discharge: HOME | End: 2025-02-27
Payer: MEDICAID

## 2025-02-24 DIAGNOSIS — Z12.31 VISIT FOR SCREENING MAMMOGRAM: ICD-10-CM

## 2025-02-24 PROCEDURE — 77063 BREAST TOMOSYNTHESIS BI: CPT

## 2025-06-05 ENCOUNTER — OFFICE VISIT (OUTPATIENT)
Age: 45
End: 2025-06-05
Payer: MEDICAID

## 2025-06-05 VITALS
BODY MASS INDEX: 31.08 KG/M2 | SYSTOLIC BLOOD PRESSURE: 111 MMHG | HEART RATE: 60 BPM | HEIGHT: 67 IN | WEIGHT: 198 LBS | OXYGEN SATURATION: 98 % | DIASTOLIC BLOOD PRESSURE: 75 MMHG

## 2025-06-05 DIAGNOSIS — J45.909 MILD REACTIVE AIRWAYS DISEASE, UNSPECIFIED WHETHER PERSISTENT: ICD-10-CM

## 2025-06-05 DIAGNOSIS — I10 ESSENTIAL HYPERTENSION: ICD-10-CM

## 2025-06-05 DIAGNOSIS — R07.9 CHEST PAIN, UNSPECIFIED TYPE: Primary | ICD-10-CM

## 2025-06-05 PROCEDURE — 3074F SYST BP LT 130 MM HG: CPT | Performed by: INTERNAL MEDICINE

## 2025-06-05 PROCEDURE — 3078F DIAST BP <80 MM HG: CPT | Performed by: INTERNAL MEDICINE

## 2025-06-05 PROCEDURE — 99214 OFFICE O/P EST MOD 30 MIN: CPT | Performed by: INTERNAL MEDICINE

## 2025-06-05 PROCEDURE — 93000 ELECTROCARDIOGRAM COMPLETE: CPT | Performed by: INTERNAL MEDICINE

## 2025-06-05 ASSESSMENT — PATIENT HEALTH QUESTIONNAIRE - PHQ9
SUM OF ALL RESPONSES TO PHQ QUESTIONS 1-9: 0
2. FEELING DOWN, DEPRESSED OR HOPELESS: NOT AT ALL
SUM OF ALL RESPONSES TO PHQ QUESTIONS 1-9: 0
1. LITTLE INTEREST OR PLEASURE IN DOING THINGS: NOT AT ALL

## 2025-06-05 NOTE — PROGRESS NOTES
[facility]    Johnna Earl is 45 y.o. female with a history of morbid obesity status post gastric bypass surgery, hypertension, asthma    Patient is here today for follow-up appointment..  She denies any prior history of MI or CHF.  Patient has undergone echocardiogram  and stress test since last visit.  She is still wearing event monitor.   Patient had gastric bypass surgery in 03/2022 for morbid obesity with maximum weight of 310 pounds.    Patient is here today after almost 18 months  Patient said that she continues to have chest pain however now it is more frequent.  Happens almost on a daily basis.  Substernal lasting for about 3-5 minutes.  No nausea vomiting diaphoresis.  Happens randomly.  Nonexertional.  No orthopnea, palpitation, presyncope syncope   Denies any nausea, vomiting, abdominal pain, fever, chills, sputum production. No hematuria or other bleeding complaints     Past Medical History:   Diagnosis Date    Anemia     receives iron infusions    Asthma     Last attack 2017    Atypical chest pain     GERD (gastroesophageal reflux disease)     H/O gastric bypass 03/2022    Hypertension     Morbid obesity (HCC)     Prolonged emergence from general anesthesia        Review of Systems:  Cardiac symptoms as noted above in HPI. All others negative.    Current Outpatient Medications   Medication Sig    fluticasone-umeclidin-vilant (TRELEGY ELLIPTA) 200-62.5-25 MCG/ACT AEPB inhaler Inhale 1 puff into the lungs daily (Patient not taking: Reported on 6/5/2025)    Hyoscyamine Sulfate SL (LEVSIN/SL) 0.125 MG SUBL Place 1 tablet under the tongue every 6 hours as needed (Pain/Spasm) (Patient not taking: Reported on 6/5/2025)    calcium citrate (CALCITRATE) 950 (200 Ca) MG tablet Take 1 tablet by mouth daily (Patient not taking: Reported on 6/5/2025)     No current facility-administered medications for this visit.       Past Surgical

## 2025-06-05 NOTE — PROGRESS NOTES
Identified pt with two pt identifiers(name and ). Reviewed record in preparation for visit and have obtained necessary documentation.    Johnna Earl presents today for   Chief Complaint   Patient presents with    Follow-up      f/u; last seen 23         Pt denied DIZZINESS, SOB, CHEST PAIN/ PRESSURE, FATIGUE/WEAKNESS, HEADACHES, SWELLING.             Johnna Earl preferred language for health care discussion is english/other.    Personal Protective Equipment:   Personal Protective Equipment was used including: mask-surgical and hands-gloves. Patient was placed on no precaution(s). Patient was not masked.    Precautions:   Patient currently on None  Patient currently roomed with door closed.    Is someone accompanying this pt? no    Is the patient using any DME equipment during OV? no    Depression Screenin/5/2025     3:39 PM 10/4/2022     1:10 PM 2022     2:14 PM 2022     2:07 PM   PHQ-9 Questionaire   Little interest or pleasure in doing things 0 0 0 0   Feeling down, depressed, or hopeless 0 0 0 0   PHQ-9 Total Score 0 0 0 0        Learning Assessment:  Who is the primary learner? Patient    What is the preferred language for health care of the primary learner? ENGLISH    How does the primary learner prefer to learn new concepts? DEMONSTRATION    Answered By self    Relationship to Learner SELF        Abuse Screenin/5/2025     3:00 PM 2023     2:00 PM 2023     3:00 PM   AMB Abuse Screening   Do you ever feel afraid of your partner? N N N   Are you in a relationship with someone who physically or mentally threatens you? N N N   Is it safe for you to go home? Y Y Y          Fall Risk      2025     3:39 PM 2023     2:10 PM 2023     3:36 PM   Fall Risk   Do you feel unsteady or are you worried about falling?  no no no   2 or more falls in past year? no no no   Fall with injury in past year? no no no         Pt currently taking Anticoagulant

## (undated) DEVICE — GLOVE SURG SZ 7 L11.33IN FNGR THK9.8MIL STRW LTX POLYMER

## (undated) DEVICE — 3M™ STERI-STRIP™ COMPOUND BENZOIN TINCTURE 40 BAGS/CARTON 4 CARTONS/CASE C1544: Brand: 3M™ STERI-STRIP™

## (undated) DEVICE — RELOAD STPL L60MM H1.5-3.6MM REG TISS BLU GRIPPING SURF B

## (undated) DEVICE — SPIROMETER INCENT 2500ML W ONE W VLV

## (undated) DEVICE — UNDERPAD INCONT W23XL36IN STD BLU POLYPR BK FLUF SFT

## (undated) DEVICE — SYRINGE MED 50ML LUERSLIP TIP

## (undated) DEVICE — STAPLER SKIN L440MM 32MM LNG 12 FIRING B FRM PWR + GRIPPING

## (undated) DEVICE — TAPE ADH W3INXL10YD PLAS TRNSPAR H2O RESIST HYPOALRG CURAD

## (undated) DEVICE — TRUE CONTENT TO BE POPULATED AS PART OF REBRANDING: Brand: ARGYLE

## (undated) DEVICE — FORCEPS BX L240CM JAW DIA2.4MM ORNG L CAP W/ NDL DISP RAD

## (undated) DEVICE — SHEAR HARMONIC ACET 5MMX36CM -- ACE PLUS

## (undated) DEVICE — TROCAR ENDOSCP L100MM DIA12MM STBL SL BLDELSS ENDOPATH XCEL

## (undated) DEVICE — YANKAUER,SMOOTH HANDLE,HIGH CAPACITY: Brand: MEDLINE INDUSTRIES, INC.

## (undated) DEVICE — STRIP,CLOSURE,WOUND,MEDI-STRIP,1/2X4: Brand: MEDLINE

## (undated) DEVICE — SUTURE VCRL SZ 2-0 L54IN ABSRB VLT W/O NDL POLYGLACTIN 910 J618H

## (undated) DEVICE — SOFT SILICONE HYDROCELLULAR SACRUM DRESSING WITH LOCK AWAY LAYER: Brand: ALLEVYN LIFE SACRUM (LARGE) PACK OF 10

## (undated) DEVICE — BITE BLOCK ENDOSCP UNIV AD 6 TO 9.4 MM

## (undated) DEVICE — GAUZE SPONGES,8 PLY: Brand: CURITY

## (undated) DEVICE — SUTURE VCRL SZ 3-0 L27IN ABSRB VLT L26MM SH 1/2 CIR J316H

## (undated) DEVICE — CANNULA NSL AD TBNG L14FT STD PVC O2 CRV CONN NONFLARED NSL

## (undated) DEVICE — MEDI-VAC NON-CONDUCTIVE SUCTION TUBING: Brand: CARDINAL HEALTH

## (undated) DEVICE — SYRINGE MED 25GA 3ML L5/8IN SUBQ PLAS W/ DETACH NDL SFTY

## (undated) DEVICE — TROCAR LAP L100MM DIA5MM BLDELSS W/ STBL SL ENDOPATH XCEL

## (undated) DEVICE — LINER SUCT CANSTR 3000CC PLAS SFT PRE ASSEMB W/OUT TBNG W/

## (undated) DEVICE — SUT SLK 2-0SH 30IN BLK --

## (undated) DEVICE — RELOAD STPL L60MM H1-2.6MM MESENTERY THN TISS WHT 6 ROW

## (undated) DEVICE — GARMENT,MEDLINE,DVT,INT,CALF,MED, GEN2: Brand: MEDLINE

## (undated) DEVICE — SCISSORS ENDOSCP DIA5MM CRV MPLR CAUT W/ RATCH HNDL

## (undated) DEVICE — PACK PROCEDURE SURG LAPAROSCOPY 17X7 MM BRTRC PRIMUS

## (undated) DEVICE — SET SUCT IRR TIP DISP STRYKEFLOW2

## (undated) DEVICE — STERILE POLYISOPRENE POWDER-FREE SURGICAL GLOVES: Brand: PROTEXIS

## (undated) DEVICE — STAPLE INT WHT BLU G GRN BLK REINF FOR ENDOPATH ECHELON FLX

## (undated) DEVICE — SOLUTION IRRIG 1000ML H2O STRL BLT

## (undated) DEVICE — INTENDED FOR TISSUE SEPARATION, AND OTHER PROCEDURES THAT REQUIRE A SHARP SURGICAL BLADE TO PUNCTURE OR CUT.: Brand: BARD-PARKER SAFETY BLADES SIZE 11, STERILE

## (undated) DEVICE — COVER,LIGHT HANDLE,FLX,1/PK: Brand: MEDLINE INDUSTRIES, INC.

## (undated) DEVICE — CATHETER SUCT TR FL TIP 14FR W/ O CTRL

## (undated) DEVICE — 4-PORT MANIFOLD: Brand: NEPTUNE 2

## (undated) DEVICE — BLANKET WRM AD W50XL85.8IN PACU FULL BODY FORC AIR

## (undated) DEVICE — TROCAR ENDOSCP BLDELSS 12X100 MM W/ HNDL STBL SL OPT TIP

## (undated) DEVICE — SOLUTION IV 1000ML 0.9% SOD CHL

## (undated) DEVICE — ENDOSCOPY PUMP TUBING/ CAP SET: Brand: ERBE

## (undated) DEVICE — REM POLYHESIVE ADULT PATIENT RETURN ELECTRODE: Brand: VALLEYLAB

## (undated) DEVICE — GAUZE,SPONGE,4"X4",16PLY,STRL,LF,10/TRAY: Brand: MEDLINE

## (undated) DEVICE — TROCAR ENDOSCP SHFT L100MM DIA12MM INTEGR STBL ENDOPATH

## (undated) DEVICE — BASIN EMSIS 16OZ GRAPHITE PLAS KID SHP MOLD GRAD FOR ORAL

## (undated) DEVICE — HANDLE PRB DIA5MM HND CTRL PSTL GRP ENDOPATH PRB + II

## (undated) DEVICE — SUTURE MCRYL SZ 4-0 L27IN ABSRB UD L24MM PS-1 3/8 CIR PRIM Y935H

## (undated) DEVICE — STAPLER SKIN LN REINF 60 MM ECHELON ENDOPATH

## (undated) DEVICE — BLANKET WRM W29.9XL79.1IN UP BODY FORC AIR MISTRAL-AIR